# Patient Record
Sex: MALE | Race: WHITE | Employment: OTHER | URBAN - METROPOLITAN AREA
[De-identification: names, ages, dates, MRNs, and addresses within clinical notes are randomized per-mention and may not be internally consistent; named-entity substitution may affect disease eponyms.]

---

## 2024-01-12 ENCOUNTER — OFFICE VISIT (OUTPATIENT)
Dept: ORTHOPEDIC SURGERY | Age: 79
End: 2024-01-12

## 2024-01-12 VITALS — HEIGHT: 70 IN | BODY MASS INDEX: 26.77 KG/M2 | WEIGHT: 187 LBS

## 2024-01-12 DIAGNOSIS — M17.0 PRIMARY OSTEOARTHRITIS OF BOTH KNEES: Primary | ICD-10-CM

## 2024-01-12 NOTE — PROGRESS NOTES
[facility] Orthopaedics and Spine  Office Visit    Chief Complaint: Bilateral knee pain    HPI:  Vicente Cervantes is a 78 y.o. who is here for evaluation of bilateral knee pain, more symptomatic on the right.  He reports multiple year history of bilateral knee pain, worsened in the last 4 months.  He has been seen and treated by  In the past with repeated injections[facility]illy at [facility] with steroid.  He denies history of injury or surgery to either knee.  He is active but currently has increased pain with steps and on hills.  He enjoys golfing and taking care of his grandkids.  He has been on over-the-counter NSAIDs and continues to have pain.  He walks without assistive device. The patient has difficulty climbing stairs, difficulty getting out of a chair, difficulty with activities of daily living including hygiene and pain at night.  Pain level at rest is 7 and with activities 9-10/10.    He denies diabetes, tobacco use, history of blood clots, blood thinners, heart or lung issues.  He has help at home.      Past Medical History:   Diagnosis Date    Chronic back pain     Hyperlipidemia     Hypertension         ROS:  Constitutional: denies fever, chills, weight loss  MSK: denies pain in other joints, muscle aches  Neurological: denies numbness, tingling, weakness    Exam:  Ht 1.778 m (5' 10\")   Wt 84.8 kg (187 lb)   BMI 26.83 kg/m²      Appearance: sitting in exam room chair, appears to be in no acute distress, awake and alert  Resp: unlabored breathing on room air  Skin: warm, dry and intact with out erythema or significant increased temperature  Neuro: grossly intact both lower extremities. Intact sensation to light touch. Motor exam 4+ to 5/5 in all major motor groups.  BLE: Examination reveals that active knee range of motion is 5 to 90 degrees.  There is varus deformity, positive crepitus, positive joint line tenderness, positive antalgic gait.  Neurologically, plantar flexion and dorsiflexion is

## 2024-01-15 NOTE — PLAN OF CARE
01/16/2024  Prepared by: Kristie Pettit    Exercises  - Supine Straight Leg Raises  - 2 x daily - 7 x weekly - 1 sets - 10 reps  - Mini Squat with Counter Support  - 2 x daily - 7 x weekly - 1 sets - 10 reps  - Supine Heel Slide with Strap  - 2 x daily - 7 x weekly - 1 sets - 10 reps  - Supine Quad Set  - 2 x daily - 7 x weekly - 1 sets - 10 reps - 5 sec hold  - Supine Gluteal Sets  - 2 x daily - 7 x weekly - 1 sets - 10 reps - 5 sec hold  - Supine Ankle Pumps  - 5-6 x daily - 7 x weekly - 1 sets - 10 reps  - Seated Knee Flexion Extension AROM   - 10-12 x daily - 7 x weekly - 1 sets - 10 reps  - Ice  - 2 x daily - 7 x weekly - 1 sets - 10 reps    Patient Education  - TKR: How Early Mobility Aids Recovery     Patient education:  Patient was thoroughly educated on this date regarding prehabilitation goals, importance of PT sessions in improving overall ROM, strength and stability prior to surgery, and how prehabilitation will facilitate improved post-operative outcomes. The patient was educated on and instructed in HEP as listed. The patient was given a detailed handout for exercises to initiate in the hospital post-operatively as well as at home. The discharge plan from the hospital was reviewed with the patient; specifically, to reduce length of hospital stay and to minimize time before reinitiating outpatient physical therapy after surgery. Education regarding early mobility post-operatively in the hospital and emphasis on working with both physical therapy and nursing staff to achieve functional mobility to safely navigate at home. Also, education regarding goals and expectations was provided; specifically, maximizing ROM to promote improved gait mechanics and ambulation. The patient was educated about all applicable post-op restrictions and precautions. The patient was encouraged to utilize ice/cold pack after surgery to address pain, minimize swelling as often as possible. It is in my medical opinion that this

## 2024-01-16 ENCOUNTER — HOSPITAL ENCOUNTER (OUTPATIENT)
Dept: PHYSICAL THERAPY | Age: 79
Setting detail: THERAPIES SERIES
Discharge: HOME | End: 2024-01-16
Attending: ORTHOPAEDIC SURGERY
Payer: MEDICARE

## 2024-01-16 DIAGNOSIS — Z78.9 NEED FOR HOME EXERCISE PROGRAM: ICD-10-CM

## 2024-01-16 DIAGNOSIS — R68.89 DECREASED ACTIVITY TOLERANCE: ICD-10-CM

## 2024-01-16 DIAGNOSIS — R26.89 DECREASED FUNCTIONAL MOBILITY: Primary | ICD-10-CM

## 2024-01-16 DIAGNOSIS — M25.561 RIGHT KNEE PAIN, UNSPECIFIED CHRONICITY: ICD-10-CM

## 2024-01-16 PROCEDURE — 97112 NEUROMUSCULAR REEDUCATION: CPT

## 2024-01-16 PROCEDURE — 97161 PT EVAL LOW COMPLEX 20 MIN: CPT

## 2024-01-16 PROCEDURE — 97110 THERAPEUTIC EXERCISES: CPT

## 2024-01-18 ENCOUNTER — TELEPHONE (OUTPATIENT)
Dept: ORTHOPEDIC SURGERY | Age: 79
End: 2024-01-18

## 2024-01-18 NOTE — TELEPHONE ENCOUNTER
General Question     Subject: QUESTIONS OF SX/PREOP  Patient and /or Facility Request: DRISS [facility]ANCE  Contact Number: [phone]    PATIENT CALLED TO SPEAK WITH CLINICAL TO SPEAK WITH THEM-PREOP/SX QUESTIONS FOR UPCOMING SURGERY.     PLEASE ADVISE

## 2024-01-22 ENCOUNTER — HOSPITAL ENCOUNTER (OUTPATIENT)
Dept: PREADMISSION TESTING | Age: 79
Discharge: HOME | End: 2024-01-26
Payer: MEDICARE

## 2024-01-22 DIAGNOSIS — M17.11 PRIMARY OSTEOARTHRITIS OF RIGHT KNEE: ICD-10-CM

## 2024-01-22 LAB
25(OH)D3 SERPL-MCNC: 31.1 NG/ML
ABO + RH BLD: NORMAL
ALBUMIN SERPL-MCNC: 4.3 G/DL (ref 3.4–5)
ANION GAP SERPL CALCULATED.3IONS-SCNC: 11 MMOL/L (ref 3–16)
APTT BLD: 34.6 SEC (ref 22.7–35.9)
BACTERIA URNS QL MICRO: ABNORMAL /HPF
BASOPHILS # BLD: 0 K/UL (ref 0–0.2)
BASOPHILS NFR BLD: 0.6 %
BILIRUB UR QL STRIP.AUTO: NEGATIVE
BLD GP AB SCN SERPL QL: NORMAL
BUN SERPL-MCNC: 14 MG/DL (ref 7–20)
CALCIUM SERPL-MCNC: 9.2 MG/DL (ref 8.3–10.6)
CHLORIDE SERPL-SCNC: 103 MMOL/L (ref 99–110)
CLARITY UR: CLEAR
CO2 SERPL-SCNC: 27 MMOL/L (ref 21–32)
COLOR UR: YELLOW
CREAT SERPL-MCNC: 0.9 MG/DL (ref 0.8–1.3)
DEPRECATED RDW RBC AUTO: 13.2 % (ref 12.4–15.4)
EKG ATRIAL RATE: 98 BPM
EKG DIAGNOSIS: NORMAL
EKG P AXIS: 66 DEGREES
EKG P-R INTERVAL: 240 MS
EKG Q-T INTERVAL: 342 MS
EKG QRS DURATION: 116 MS
EKG QTC CALCULATION (BAZETT): 436 MS
EKG R AXIS: -31 DEGREES
EKG T AXIS: 31 DEGREES
EKG VENTRICULAR RATE: 98 BPM
EOSINOPHIL # BLD: 0.1 K/UL (ref 0–0.6)
EOSINOPHIL NFR BLD: 1.3 %
EPI CELLS #/AREA URNS AUTO: 0 /HPF (ref 0–5)
GFR SERPLBLD CREATININE-BSD FMLA CKD-EPI: >60 ML/MIN/{1.73_M2}
GLUCOSE SERPL-MCNC: 138 MG/DL (ref 70–99)
GLUCOSE UR STRIP.AUTO-MCNC: NEGATIVE MG/DL
HCT VFR BLD AUTO: 44 % (ref 40.5–52.5)
HGB BLD-MCNC: 15.1 G/DL (ref 13.5–17.5)
HGB UR QL STRIP.AUTO: NEGATIVE
HYALINE CASTS #/AREA URNS AUTO: 0 /LPF (ref 0–8)
INR PPP: 1 (ref 0.84–1.16)
KETONES UR STRIP.AUTO-MCNC: NEGATIVE MG/DL
LEUKOCYTE ESTERASE UR QL STRIP.AUTO: ABNORMAL
LYMPHOCYTES # BLD: 1.3 K/UL (ref 1–5.1)
LYMPHOCYTES NFR BLD: 24.4 %
MCH RBC QN AUTO: 31.8 PG (ref 26–34)
MCHC RBC AUTO-ENTMCNC: 34.4 G/DL (ref 31–36)
MCV RBC AUTO: 92.4 FL (ref 80–100)
MONOCYTES # BLD: 0.4 K/UL (ref 0–1.3)
MONOCYTES NFR BLD: 6.7 %
MUCUS: PRESENT
NEUTROPHILS # BLD: 3.7 K/UL (ref 1.7–7.7)
NEUTROPHILS NFR BLD: 67 %
NITRITE UR QL STRIP.AUTO: NEGATIVE
PH UR STRIP.AUTO: 6 [PH] (ref 5–8)
PLATELET # BLD AUTO: 182 K/UL (ref 135–450)
PMV BLD AUTO: 9 FL (ref 5–10.5)
POTASSIUM SERPL-SCNC: 3.5 MMOL/L (ref 3.5–5.1)
PREALB SERPL-MCNC: 21.1 MG/DL (ref 20–40)
PROT UR STRIP.AUTO-MCNC: NEGATIVE MG/DL
PROTHROMBIN TIME: 13.2 SEC (ref 11.5–14.8)
RBC # BLD AUTO: 4.77 M/UL (ref 4.2–5.9)
RBC CLUMPS #/AREA URNS AUTO: 1 /HPF (ref 0–4)
SODIUM SERPL-SCNC: 141 MMOL/L (ref 136–145)
SP GR UR STRIP.AUTO: 1.02 (ref 1–1.03)
UA COMPLETE W REFLEX CULTURE PNL UR: ABNORMAL
UA DIPSTICK W REFLEX MICRO PNL UR: YES
URN SPEC COLLECT METH UR: ABNORMAL
UROBILINOGEN UR STRIP-ACNC: 1 E.U./DL
WBC # BLD AUTO: 5.5 K/UL (ref 4–11)
WBC #/AREA URNS AUTO: 6 /HPF (ref 0–5)

## 2024-01-22 PROCEDURE — 86900 BLOOD TYPING SEROLOGIC ABO: CPT

## 2024-01-22 PROCEDURE — 80048 BASIC METABOLIC PNL TOTAL CA: CPT

## 2024-01-22 PROCEDURE — 82306 VITAMIN D 25 HYDROXY: CPT

## 2024-01-22 PROCEDURE — 85025 COMPLETE CBC W/AUTO DIFF WBC: CPT

## 2024-01-22 PROCEDURE — 85730 THROMBOPLASTIN TIME PARTIAL: CPT

## 2024-01-22 PROCEDURE — 83036 HEMOGLOBIN GLYCOSYLATED A1C: CPT

## 2024-01-22 PROCEDURE — 85610 PROTHROMBIN TIME: CPT

## 2024-01-22 PROCEDURE — 81001 URINALYSIS AUTO W/SCOPE: CPT

## 2024-01-22 PROCEDURE — 84134 ASSAY OF PREALBUMIN: CPT

## 2024-01-22 PROCEDURE — 86850 RBC ANTIBODY SCREEN: CPT

## 2024-01-22 PROCEDURE — 87641 MR-STAPH DNA AMP PROBE: CPT

## 2024-01-22 PROCEDURE — 93005 ELECTROCARDIOGRAM TRACING: CPT

## 2024-01-22 PROCEDURE — 82040 ASSAY OF SERUM ALBUMIN: CPT

## 2024-01-22 PROCEDURE — 86901 BLOOD TYPING SEROLOGIC RH(D): CPT

## 2024-01-22 NOTE — PROGRESS NOTES
Patient  attended JET class on  1/22/2024  . Patient verified surgery for Total Knee replacement. Patient  received patient information and educational JET folder including the following handouts: jet powerpoint, ERAS, incentive spirometry including purpose and how to perform, case management contact information, hand hygiene, preventing constipation, choices for home health care agency list, pre-operative showering techniques and the use of anti-septic 3 days before surgery.  Interviews completed by PT, OT, and Nurse Navigator.  Labs and Tests to be completed/completed as ordered/necessary by outpatient lab if not already completed.  Anti-septic bottle given to patient to take home. Patient  states no further questions or concerns. Patient provided with orthopedic office, after hours clinic, case management, and nurse navigator contact information.    Date Of Surgery: 2/6/2024  Surgeon: Dr Pate  Per Patient Will see/Saw Primary care provider on 2/1/2024.       HISTORY OF JOINT REPLACEMENT(S): No history of joint replacement    DC Plan: Home    HOME ASSISTANCE - WHO WILL BE STAYING WITH YOU AT HOME FOR FIRST SEVERAL DAYS? spouse Amanda    DC TRANSPORTATION: spouse Amanda    STEPS INTO HOME: 1    STEPS TO BATHROOM/BEDROOM: able to live on the first level, full bath and bedroom on second floor with 10 steps from first level.    DME NEEDS:  needs a transfer tub bench, he might have a two wheeled walker. Plans to obtain transfer tub bench preop.    LENGTH OF STAY HAS BEEN DISCUSSED WITH THE PATIENT, APPROPRIATE TO HIS/ HER PROCEDURE.  PATIENT HAS BEEN INFORMED THAT THEY WILL BE DISCHARGED WHEN THE PHYSICIAN DEEMS THEM MEDICALLY READY. MOST PATIENTS CAN EXPECT TO BE IN THE HOSPITAL ONE NIGHT OR 1-2 DAYS AS AN ADMISSION FOR THOSE WITH MEDICAL HEALTH ISSUES/COMPLICATIONS.    HOME CARE CHOICE(S): open to any agency, home health care list was provided to patient.    SNF/REHAB CHOICES (S):     Declined a need for skilled

## 2024-01-23 ENCOUNTER — TELEPHONE (OUTPATIENT)
Dept: ORTHOPEDIC SURGERY | Age: 79
End: 2024-01-23

## 2024-01-23 LAB
EST. AVERAGE GLUCOSE BLD GHB EST-MCNC: 114 MG/DL
HBA1C MFR BLD: 5.6 %
MRSA DNA SPEC QL NAA+PROBE: NORMAL

## 2024-01-23 RX ORDER — LOSARTAN POTASSIUM AND HYDROCHLOROTHIAZIDE 12.5; 5 MG/1; MG/1
1 TABLET ORAL DAILY
COMMUNITY

## 2024-01-23 RX ORDER — BIMATOPROST 0.1 MG/ML
1 SOLUTION/ DROPS OPHTHALMIC NIGHTLY
COMMUNITY

## 2024-01-23 RX ORDER — ROSUVASTATIN CALCIUM 20 MG/1
20 TABLET, COATED ORAL NIGHTLY
COMMUNITY
Start: 2020-10-02

## 2024-01-23 NOTE — TELEPHONE ENCOUNTER
[facility] [employer] [facility]  Reference # [id number]

## 2024-01-25 NOTE — TELEPHONE ENCOUNTER
Auth: # [id number]     Date Range: 02/06/2024 - 05/05/2024   Type of SX:  OUTPATIENT  Location: [facility]  CPT: 69586   DX Code: M17.11  SX area: RIGHT KNEE  Insurance: AMY

## 2024-01-26 ENCOUNTER — HOSPITAL ENCOUNTER (OUTPATIENT)
Dept: CT IMAGING | Age: 79
Discharge: HOME | End: 2024-01-26
Attending: ORTHOPAEDIC SURGERY
Payer: MEDICARE

## 2024-01-26 DIAGNOSIS — M17.11 PRIMARY OSTEOARTHRITIS OF RIGHT KNEE: ICD-10-CM

## 2024-01-26 PROCEDURE — 73700 CT LOWER EXTREMITY W/O DYE: CPT

## 2024-01-29 ENCOUNTER — OFFICE VISIT (OUTPATIENT)
Dept: ORTHOPEDIC SURGERY | Age: 79
End: 2024-01-29
Payer: MEDICARE

## 2024-01-29 ENCOUNTER — TELEPHONE (OUTPATIENT)
Dept: ORTHOPEDIC SURGERY | Age: 79
End: 2024-01-29

## 2024-01-29 VITALS — BODY MASS INDEX: 26.26 KG/M2 | WEIGHT: 183.4 LBS | HEIGHT: 70 IN

## 2024-01-29 DIAGNOSIS — M17.11 PRIMARY OSTEOARTHRITIS OF RIGHT KNEE: Primary | ICD-10-CM

## 2024-01-29 PROCEDURE — 99214 OFFICE O/P EST MOD 30 MIN: CPT | Performed by: ORTHOPAEDIC SURGERY

## 2024-01-29 PROCEDURE — MISC60 ORTHOTIC REFURBISHMENT 60: Performed by: ORTHOPAEDIC SURGERY

## 2024-01-29 PROCEDURE — 1123F ACP DISCUSS/DSCN MKR DOCD: CPT | Performed by: ORTHOPAEDIC SURGERY

## 2024-01-29 NOTE — PROGRESS NOTES
[facility] Orthopaedics and Spine  Office Visit    Chief Complaint: Bilateral knee pain    HPI:  Vicente Cervantes is a 78 y.o. who is here in follow-up of bilateral knee pain, more symptomatic on the right.  He is scheduled for right total knee arthroplasty next week.  For review, he reports multiple year history of bilateral knee pain, worsened in the last months.  He has been seen and treated by Dr. Sheets in the past with repeated injections with steroid.  He denies history of injury or surgery to either knee.  He is active but currently has increased pain with steps and on hills.  He enjoys golfing and taking care of his grandkids.  He has been on over-the-counter NSAIDs and continues to have pain.  He walks without assistive device. The patient has difficulty climbing stairs, difficulty getting out of a chair, difficulty with activities of daily living including hygiene and pain at night.  Pain level at rest is 7 and with activities 9-10/10.    He denies diabetes, tobacco use, history of blood clots, blood thinners, heart or lung issues.  He has help at home.      Past Medical History:   Diagnosis Date    Chronic back pain     Hyperlipidemia     Hypertension         ROS:  Constitutional: denies fever, chills, weight loss  MSK: denies pain in other joints, muscle aches  Neurological: denies numbness, tingling, weakness    Exam:  Appearance: sitting in exam room chair, appears to be in no acute distress, awake and alert  Resp: unlabored breathing on room air  Skin: warm, dry and intact with out erythema or significant increased temperature  Neuro: grossly intact both lower extremities. Intact sensation to light touch. Motor exam 4+ to 5/5 in all major motor groups.  BLE: Examination reveals that active knee range of motion is 5 to 90 degrees.  There is varus deformity, positive crepitus, positive joint line tenderness, positive antalgic gait.  Neurologically, plantar flexion and dorsiflexion is intact.

## 2024-01-29 NOTE — TELEPHONE ENCOUNTER
General Question     Subject: POST OP HOME CARE  Patient and /or Facility Request: DRISS VANCE  Contact Number: [phone]    PT'S WIFE ARISTEO CALLED STATING PT IS SCHEDULED TO HAVE SURGERY WITH DR PAULINO ON 2/6/24, SHE STATED PHYSICAL THERAPIST WILL BE COMING TO HIS HOUSE AFTER SURGERY, PT'S WIFE IS WANTING TO KNOW IF THIS NEEDS TO GET AUTHORIZED THROUGH INSURANCE FIRST OR IF THEY HAVE TO PAY OUT OF POCKET.    PLEASE CALL PT'S WIFE BACK AT THE ABOVE NUMBER.

## 2024-01-30 ENCOUNTER — TELEPHONE (OUTPATIENT)
Dept: ORTHOPEDIC SURGERY | Age: 79
End: 2024-01-30

## 2024-01-30 NOTE — TELEPHONE ENCOUNTER
Surgery and/or Procedure Scheduling     PT - HAS IT BEEN AUTHORIZED BY INSURANCE ALREADY    Contact Name: CervantesVicente \"Bill\"  Surgical/Procedure Request: PT   Patient Contact Number: [phone]     Pt WOULD LIKE TO MAKE SURE THE Pt PA HAS BEEN REQESTED/IS DONE. HE IS MAKING SURE HE HAS EVERYTHING IN ORDER BEFORE THE SX. PLEASE CALL @ THE # ABOVE, AND OK TO TRY THE HOME PHONE, IF HE DOESN'T ANSWER THE CELL #.

## 2024-02-01 NOTE — DISCHARGE INSTR - COC
Or need medication refills.   Excessive swelling in your ankle.  You develop numbness, tingling, or decreased movement.  You have a fever greater than 100 degrees for a day or over 101 degrees at any one time.  If you fall.    You have any questions about your recovery.  Inform your family doctor/dentist or any other doctor who cares for you in the future that you have a joint replacement.  They may want to order antibiotics for dental or surgical procedures.  If you have required the use of insulin to control your blood sugar after surgery, follow up with your family doctor.   If you have any questions regarding your discharge instructions, please call Radha Pereyra Nurse Navigator [phone] or your surgeon's office.

## 2024-02-02 ENCOUNTER — TELEPHONE (OUTPATIENT)
Dept: ORTHOPEDIC SURGERY | Age: 79
End: 2024-02-02

## 2024-02-05 ENCOUNTER — ANESTHESIA EVENT (OUTPATIENT)
Dept: OPERATING ROOM | Age: 79
End: 2024-02-05
Payer: MEDICARE

## 2024-02-06 ENCOUNTER — ANESTHESIA (OUTPATIENT)
Dept: OPERATING ROOM | Age: 79
End: 2024-02-06
Payer: MEDICARE

## 2024-02-06 ENCOUNTER — APPOINTMENT (OUTPATIENT)
Dept: GENERAL RADIOLOGY | Age: 79
End: 2024-02-06
Attending: ORTHOPAEDIC SURGERY
Payer: MEDICARE

## 2024-02-06 ENCOUNTER — HOSPITAL ENCOUNTER (OUTPATIENT)
Age: 79
Setting detail: SURGERY ADMIT
Discharge: HOME | End: 2024-02-06
Attending: ORTHOPAEDIC SURGERY | Admitting: ORTHOPAEDIC SURGERY
Payer: MEDICARE

## 2024-02-06 VITALS
TEMPERATURE: 97.1 F | OXYGEN SATURATION: 95 % | HEIGHT: 70 IN | WEIGHT: 185.74 LBS | DIASTOLIC BLOOD PRESSURE: 76 MMHG | HEART RATE: 59 BPM | SYSTOLIC BLOOD PRESSURE: 154 MMHG | RESPIRATION RATE: 16 BRPM | BODY MASS INDEX: 26.59 KG/M2

## 2024-02-06 DIAGNOSIS — M17.11 PRIMARY OSTEOARTHRITIS OF RIGHT KNEE: Primary | ICD-10-CM

## 2024-02-06 LAB
ABO + RH BLD: NORMAL
BLD GP AB SCN SERPL QL: NORMAL
GLUCOSE BLD-MCNC: 97 MG/DL (ref 70–99)
PERFORMED ON: NORMAL

## 2024-02-06 PROCEDURE — 2500000003 HC RX 250 WO HCPCS: Performed by: NURSE ANESTHETIST, CERTIFIED REGISTERED

## 2024-02-06 PROCEDURE — 97530 THERAPEUTIC ACTIVITIES: CPT

## 2024-02-06 PROCEDURE — 3700000000 HC ANESTHESIA ATTENDED CARE: Performed by: ORTHOPAEDIC SURGERY

## 2024-02-06 PROCEDURE — 86850 RBC ANTIBODY SCREEN: CPT

## 2024-02-06 PROCEDURE — 2580000003 HC RX 258: Performed by: ANESTHESIOLOGY

## 2024-02-06 PROCEDURE — 6370000000 HC RX 637 (ALT 250 FOR IP): Performed by: NURSE PRACTITIONER

## 2024-02-06 PROCEDURE — 97165 OT EVAL LOW COMPLEX 30 MIN: CPT

## 2024-02-06 PROCEDURE — 6360000002 HC RX W HCPCS: Performed by: ANESTHESIOLOGY

## 2024-02-06 PROCEDURE — C9290 INJ, BUPIVACAINE LIPOSOME: HCPCS | Performed by: ORTHOPAEDIC SURGERY

## 2024-02-06 PROCEDURE — C1776 JOINT DEVICE (IMPLANTABLE): HCPCS | Performed by: ORTHOPAEDIC SURGERY

## 2024-02-06 PROCEDURE — 7100000000 HC PACU RECOVERY - FIRST 15 MIN: Performed by: ORTHOPAEDIC SURGERY

## 2024-02-06 PROCEDURE — 2580000003 HC RX 258: Performed by: ORTHOPAEDIC SURGERY

## 2024-02-06 PROCEDURE — 6360000002 HC RX W HCPCS

## 2024-02-06 PROCEDURE — 6360000002 HC RX W HCPCS: Performed by: NURSE ANESTHETIST, CERTIFIED REGISTERED

## 2024-02-06 PROCEDURE — 7100000001 HC PACU RECOVERY - ADDTL 15 MIN: Performed by: ORTHOPAEDIC SURGERY

## 2024-02-06 PROCEDURE — 2580000003 HC RX 258: Performed by: NURSE ANESTHETIST, CERTIFIED REGISTERED

## 2024-02-06 PROCEDURE — 6360000002 HC RX W HCPCS: Performed by: ORTHOPAEDIC SURGERY

## 2024-02-06 PROCEDURE — 3600000015 HC SURGERY LEVEL 5 ADDTL 15MIN: Performed by: ORTHOPAEDIC SURGERY

## 2024-02-06 PROCEDURE — 7100000010 HC PHASE II RECOVERY - FIRST 15 MIN: Performed by: ORTHOPAEDIC SURGERY

## 2024-02-06 PROCEDURE — 2720000010 HC SURG SUPPLY STERILE: Performed by: ORTHOPAEDIC SURGERY

## 2024-02-06 PROCEDURE — 97116 GAIT TRAINING THERAPY: CPT

## 2024-02-06 PROCEDURE — 86900 BLOOD TYPING SEROLOGIC ABO: CPT

## 2024-02-06 PROCEDURE — 7100000011 HC PHASE II RECOVERY - ADDTL 15 MIN: Performed by: ORTHOPAEDIC SURGERY

## 2024-02-06 PROCEDURE — 3600000005 HC SURGERY LEVEL 5 BASE: Performed by: ORTHOPAEDIC SURGERY

## 2024-02-06 PROCEDURE — 3700000001 HC ADD 15 MINUTES (ANESTHESIA): Performed by: ORTHOPAEDIC SURGERY

## 2024-02-06 PROCEDURE — 97162 PT EVAL MOD COMPLEX 30 MIN: CPT

## 2024-02-06 PROCEDURE — A4217 STERILE WATER/SALINE, 500 ML: HCPCS | Performed by: ORTHOPAEDIC SURGERY

## 2024-02-06 PROCEDURE — 6370000000 HC RX 637 (ALT 250 FOR IP): Performed by: ANESTHESIOLOGY

## 2024-02-06 PROCEDURE — 64447 NJX AA&/STRD FEMORAL NRV IMG: CPT | Performed by: ANESTHESIOLOGY

## 2024-02-06 PROCEDURE — 6370000000 HC RX 637 (ALT 250 FOR IP): Performed by: ORTHOPAEDIC SURGERY

## 2024-02-06 PROCEDURE — 97535 SELF CARE MNGMENT TRAINING: CPT

## 2024-02-06 PROCEDURE — 73560 X-RAY EXAM OF KNEE 1 OR 2: CPT

## 2024-02-06 PROCEDURE — 86901 BLOOD TYPING SEROLOGIC RH(D): CPT

## 2024-02-06 PROCEDURE — 97110 THERAPEUTIC EXERCISES: CPT

## 2024-02-06 PROCEDURE — 2709999900 HC NON-CHARGEABLE SUPPLY: Performed by: ORTHOPAEDIC SURGERY

## 2024-02-06 DEVICE — BASEPLATE TIB SZ 6 AP52MM ML77MM KNEE TRITANIUM 4 CRUCFRM: Type: IMPLANTABLE DEVICE | Site: KNEE | Status: FUNCTIONAL

## 2024-02-06 DEVICE — IMPL KNEE TIB INSRT POSTR SZ 6 9MM: Type: IMPLANTABLE DEVICE | Site: KNEE | Status: FUNCTIONAL

## 2024-02-06 DEVICE — COMPONENT PAT DIA35MM THK10MM SUPERIOR/INFERIOR KNEE: Type: IMPLANTABLE DEVICE | Site: KNEE | Status: FUNCTIONAL

## 2024-02-06 DEVICE — IMPLANTABLE DEVICE: Type: IMPLANTABLE DEVICE | Site: KNEE | Status: FUNCTIONAL

## 2024-02-06 RX ORDER — GLYCOPYRROLATE 0.2 MG/ML
INJECTION INTRAMUSCULAR; INTRAVENOUS PRN
Status: DISCONTINUED | OUTPATIENT
Start: 2024-02-06 | End: 2024-02-06 | Stop reason: SDUPTHER

## 2024-02-06 RX ORDER — HALOPERIDOL 5 MG/ML
1 INJECTION INTRAMUSCULAR
Status: DISCONTINUED | OUTPATIENT
Start: 2024-02-06 | End: 2024-02-06 | Stop reason: HOSPADM

## 2024-02-06 RX ORDER — SODIUM CHLORIDE 9 MG/ML
INJECTION, SOLUTION INTRAVENOUS CONTINUOUS PRN
Status: DISCONTINUED | OUTPATIENT
Start: 2024-02-06 | End: 2024-02-06 | Stop reason: SDUPTHER

## 2024-02-06 RX ORDER — ROPIVACAINE HYDROCHLORIDE 5 MG/ML
INJECTION, SOLUTION EPIDURAL; INFILTRATION; PERINEURAL
Status: COMPLETED | OUTPATIENT
Start: 2024-02-06 | End: 2024-02-06

## 2024-02-06 RX ORDER — PROPOFOL 10 MG/ML
INJECTION, EMULSION INTRAVENOUS PRN
Status: DISCONTINUED | OUTPATIENT
Start: 2024-02-06 | End: 2024-02-06 | Stop reason: SDUPTHER

## 2024-02-06 RX ORDER — OXYCODONE HYDROCHLORIDE 5 MG/1
5 TABLET ORAL
Status: COMPLETED | OUTPATIENT
Start: 2024-02-06 | End: 2024-02-06

## 2024-02-06 RX ORDER — SODIUM CHLORIDE 0.9 % (FLUSH) 0.9 %
5-40 SYRINGE (ML) INJECTION PRN
Status: DISCONTINUED | OUTPATIENT
Start: 2024-02-06 | End: 2024-02-06 | Stop reason: HOSPADM

## 2024-02-06 RX ORDER — OXYCODONE HYDROCHLORIDE 10 MG/1
10 TABLET ORAL ONCE
Status: COMPLETED | OUTPATIENT
Start: 2024-02-06 | End: 2024-02-06

## 2024-02-06 RX ORDER — FENTANYL CITRATE 50 UG/ML
INJECTION, SOLUTION INTRAMUSCULAR; INTRAVENOUS PRN
Status: DISCONTINUED | OUTPATIENT
Start: 2024-02-06 | End: 2024-02-06 | Stop reason: SDUPTHER

## 2024-02-06 RX ORDER — MAGNESIUM HYDROXIDE 1200 MG/15ML
LIQUID ORAL CONTINUOUS PRN
Status: COMPLETED | OUTPATIENT
Start: 2024-02-06 | End: 2024-02-06

## 2024-02-06 RX ORDER — SODIUM CHLORIDE 0.9 % (FLUSH) 0.9 %
5-40 SYRINGE (ML) INJECTION EVERY 12 HOURS SCHEDULED
Status: DISCONTINUED | OUTPATIENT
Start: 2024-02-06 | End: 2024-02-06 | Stop reason: HOSPADM

## 2024-02-06 RX ORDER — MELOXICAM 7.5 MG/1
7.5 TABLET ORAL ONCE
Status: COMPLETED | OUTPATIENT
Start: 2024-02-06 | End: 2024-02-06

## 2024-02-06 RX ORDER — FENTANYL CITRATE 0.05 MG/ML
50 INJECTION, SOLUTION INTRAMUSCULAR; INTRAVENOUS EVERY 5 MIN PRN
Status: DISCONTINUED | OUTPATIENT
Start: 2024-02-06 | End: 2024-02-06 | Stop reason: HOSPADM

## 2024-02-06 RX ORDER — ACETAMINOPHEN 500 MG
1000 TABLET ORAL ONCE
Status: COMPLETED | OUTPATIENT
Start: 2024-02-06 | End: 2024-02-06

## 2024-02-06 RX ORDER — SODIUM CHLORIDE 9 MG/ML
INJECTION, SOLUTION INTRAVENOUS PRN
Status: DISCONTINUED | OUTPATIENT
Start: 2024-02-06 | End: 2024-02-06 | Stop reason: HOSPADM

## 2024-02-06 RX ORDER — TRANEXAMIC ACID 650 MG/1
1950 TABLET ORAL ONCE
Status: COMPLETED | OUTPATIENT
Start: 2024-02-06 | End: 2024-02-06

## 2024-02-06 RX ORDER — ONDANSETRON 2 MG/ML
INJECTION INTRAMUSCULAR; INTRAVENOUS PRN
Status: DISCONTINUED | OUTPATIENT
Start: 2024-02-06 | End: 2024-02-06 | Stop reason: SDUPTHER

## 2024-02-06 RX ORDER — OXYCODONE HYDROCHLORIDE 5 MG/1
5-10 TABLET ORAL EVERY 6 HOURS PRN
Qty: 40 TABLET | Refills: 0 | Status: SHIPPED | OUTPATIENT
Start: 2024-02-06 | End: 2024-02-11

## 2024-02-06 RX ORDER — LIDOCAINE HYDROCHLORIDE 20 MG/ML
INJECTION, SOLUTION EPIDURAL; INFILTRATION; INTRACAUDAL; PERINEURAL PRN
Status: DISCONTINUED | OUTPATIENT
Start: 2024-02-06 | End: 2024-02-06 | Stop reason: SDUPTHER

## 2024-02-06 RX ORDER — DIPHENHYDRAMINE HYDROCHLORIDE 50 MG/ML
12.5 INJECTION INTRAMUSCULAR; INTRAVENOUS
Status: DISCONTINUED | OUTPATIENT
Start: 2024-02-06 | End: 2024-02-06 | Stop reason: HOSPADM

## 2024-02-06 RX ORDER — ASPIRIN 81 MG/1
81 TABLET ORAL
Qty: 28 TABLET | Refills: 0 | Status: SHIPPED | OUTPATIENT
Start: 2024-02-06 | End: 2024-02-20

## 2024-02-06 RX ORDER — ONDANSETRON 2 MG/ML
4 INJECTION INTRAMUSCULAR; INTRAVENOUS
Status: DISCONTINUED | OUTPATIENT
Start: 2024-02-06 | End: 2024-02-06 | Stop reason: HOSPADM

## 2024-02-06 RX ORDER — MEPERIDINE HYDROCHLORIDE 25 MG/ML
12.5 INJECTION INTRAMUSCULAR; INTRAVENOUS; SUBCUTANEOUS
Status: DISCONTINUED | OUTPATIENT
Start: 2024-02-06 | End: 2024-02-06 | Stop reason: HOSPADM

## 2024-02-06 RX ORDER — METHOCARBAMOL 100 MG/ML
INJECTION, SOLUTION INTRAMUSCULAR; INTRAVENOUS PRN
Status: DISCONTINUED | OUTPATIENT
Start: 2024-02-06 | End: 2024-02-06 | Stop reason: SDUPTHER

## 2024-02-06 RX ORDER — DEXAMETHASONE SODIUM PHOSPHATE 4 MG/ML
INJECTION, SOLUTION INTRA-ARTICULAR; INTRALESIONAL; INTRAMUSCULAR; INTRAVENOUS; SOFT TISSUE PRN
Status: DISCONTINUED | OUTPATIENT
Start: 2024-02-06 | End: 2024-02-06 | Stop reason: SDUPTHER

## 2024-02-06 RX ORDER — CEPHALEXIN 500 MG/1
500 CAPSULE ORAL SEE ADMIN INSTRUCTIONS
Qty: 2 CAPSULE | Refills: 0 | Status: SHIPPED | OUTPATIENT
Start: 2024-02-06

## 2024-02-06 RX ORDER — LORAZEPAM 2 MG/ML
0.5 INJECTION INTRAMUSCULAR
Status: DISCONTINUED | OUTPATIENT
Start: 2024-02-06 | End: 2024-02-06 | Stop reason: HOSPADM

## 2024-02-06 RX ORDER — ROCURONIUM BROMIDE 10 MG/ML
INJECTION, SOLUTION INTRAVENOUS PRN
Status: DISCONTINUED | OUTPATIENT
Start: 2024-02-06 | End: 2024-02-06 | Stop reason: SDUPTHER

## 2024-02-06 RX ORDER — SUCCINYLCHOLINE/SOD CL,ISO/PF 200MG/10ML
SYRINGE (ML) INTRAVENOUS PRN
Status: DISCONTINUED | OUTPATIENT
Start: 2024-02-06 | End: 2024-02-06 | Stop reason: SDUPTHER

## 2024-02-06 RX ADMIN — Medication 120 MG: at 09:29

## 2024-02-06 RX ADMIN — ROPIVACAINE HYDROCHLORIDE 15 ML: 5 INJECTION, SOLUTION EPIDURAL; INFILTRATION; PERINEURAL at 08:45

## 2024-02-06 RX ADMIN — CEFAZOLIN 2000 MG: 10 INJECTION, POWDER, FOR SOLUTION INTRAVENOUS at 09:34

## 2024-02-06 RX ADMIN — SODIUM CHLORIDE: 9 INJECTION, SOLUTION INTRAVENOUS at 10:31

## 2024-02-06 RX ADMIN — ROCURONIUM BROMIDE 45 MG: 10 INJECTION INTRAVENOUS at 09:38

## 2024-02-06 RX ADMIN — ROCURONIUM BROMIDE 5 MG: 10 INJECTION INTRAVENOUS at 09:28

## 2024-02-06 RX ADMIN — SODIUM CHLORIDE: 9 INJECTION, SOLUTION INTRAVENOUS at 09:24

## 2024-02-06 RX ADMIN — OXYCODONE 5 MG: 5 TABLET ORAL at 13:34

## 2024-02-06 RX ADMIN — FENTANYL CITRATE 50 MCG: 50 INJECTION INTRAMUSCULAR; INTRAVENOUS at 08:34

## 2024-02-06 RX ADMIN — METHOCARBAMOL 250 MG: 100 INJECTION INTRAMUSCULAR; INTRAVENOUS at 10:17

## 2024-02-06 RX ADMIN — FENTANYL CITRATE 50 MCG: 50 INJECTION INTRAMUSCULAR; INTRAVENOUS at 09:27

## 2024-02-06 RX ADMIN — SODIUM CHLORIDE 2000 MG: 900 INJECTION INTRAVENOUS at 14:38

## 2024-02-06 RX ADMIN — OXYCODONE HYDROCHLORIDE 10 MG: 10 TABLET ORAL at 08:00

## 2024-02-06 RX ADMIN — METHOCARBAMOL 250 MG: 100 INJECTION INTRAMUSCULAR; INTRAVENOUS at 10:02

## 2024-02-06 RX ADMIN — ACETAMINOPHEN 1000 MG: 500 TABLET ORAL at 14:34

## 2024-02-06 RX ADMIN — HYDROMORPHONE HYDROCHLORIDE 0.25 MG: 1 INJECTION, SOLUTION INTRAMUSCULAR; INTRAVENOUS; SUBCUTANEOUS at 10:44

## 2024-02-06 RX ADMIN — SODIUM CHLORIDE: 9 INJECTION, SOLUTION INTRAVENOUS at 07:53

## 2024-02-06 RX ADMIN — ROPIVACAINE HYDROCHLORIDE 15 ML: 5 INJECTION, SOLUTION EPIDURAL; INFILTRATION; PERINEURAL at 08:50

## 2024-02-06 RX ADMIN — DEXAMETHASONE SODIUM PHOSPHATE 8 MG: 4 INJECTION, SOLUTION INTRAMUSCULAR; INTRAVENOUS at 09:38

## 2024-02-06 RX ADMIN — METHOCARBAMOL 250 MG: 100 INJECTION INTRAMUSCULAR; INTRAVENOUS at 09:50

## 2024-02-06 RX ADMIN — SUGAMMADEX 200 MG: 100 INJECTION, SOLUTION INTRAVENOUS at 10:39

## 2024-02-06 RX ADMIN — HYDROMORPHONE HYDROCHLORIDE 0.25 MG: 1 INJECTION, SOLUTION INTRAMUSCULAR; INTRAVENOUS; SUBCUTANEOUS at 11:17

## 2024-02-06 RX ADMIN — METHOCARBAMOL 250 MG: 100 INJECTION INTRAMUSCULAR; INTRAVENOUS at 09:56

## 2024-02-06 RX ADMIN — PROPOFOL 150 MG: 10 INJECTION, EMULSION INTRAVENOUS at 09:28

## 2024-02-06 RX ADMIN — MELOXICAM 7.5 MG: 7.5 TABLET ORAL at 08:00

## 2024-02-06 RX ADMIN — LIDOCAINE HYDROCHLORIDE 80 MG: 20 INJECTION, SOLUTION EPIDURAL; INFILTRATION; INTRACAUDAL; PERINEURAL at 09:27

## 2024-02-06 RX ADMIN — ONDANSETRON 4 MG: 2 INJECTION INTRAMUSCULAR; INTRAVENOUS at 10:31

## 2024-02-06 RX ADMIN — TRANEXAMIC ACID 1950 MG: 650 TABLET ORAL at 08:00

## 2024-02-06 RX ADMIN — GLYCOPYRROLATE 0.2 MG: 0.2 INJECTION INTRAMUSCULAR; INTRAVENOUS at 09:47

## 2024-02-06 ASSESSMENT — LIFESTYLE VARIABLES: SMOKING_STATUS: 0

## 2024-02-06 ASSESSMENT — PAIN SCALES - GENERAL
PAINLEVEL_OUTOF10: 7
PAINLEVEL_OUTOF10: 3
PAINLEVEL_OUTOF10: 5
PAINLEVEL_OUTOF10: 6
PAINLEVEL_OUTOF10: 4
PAINLEVEL_OUTOF10: 5

## 2024-02-06 ASSESSMENT — PAIN DESCRIPTION - PAIN TYPE
TYPE: SURGICAL PAIN
TYPE: SURGICAL PAIN

## 2024-02-06 ASSESSMENT — PAIN - FUNCTIONAL ASSESSMENT
PAIN_FUNCTIONAL_ASSESSMENT: 0-10
PAIN_FUNCTIONAL_ASSESSMENT: PREVENTS OR INTERFERES SOME ACTIVE ACTIVITIES AND ADLS
PAIN_FUNCTIONAL_ASSESSMENT: PREVENTS OR INTERFERES SOME ACTIVE ACTIVITIES AND ADLS
PAIN_FUNCTIONAL_ASSESSMENT: 0-10
PAIN_FUNCTIONAL_ASSESSMENT: PREVENTS OR INTERFERES SOME ACTIVE ACTIVITIES AND ADLS

## 2024-02-06 ASSESSMENT — PAIN DESCRIPTION - DESCRIPTORS
DESCRIPTORS: DISCOMFORT
DESCRIPTORS: ACHING;SORE
DESCRIPTORS: DISCOMFORT
DESCRIPTORS: DISCOMFORT
DESCRIPTORS: DISCOMFORT;SORE

## 2024-02-06 ASSESSMENT — PAIN DESCRIPTION - LOCATION
LOCATION: KNEE

## 2024-02-06 ASSESSMENT — PAIN DESCRIPTION - ORIENTATION
ORIENTATION: RIGHT

## 2024-02-06 ASSESSMENT — PAIN DESCRIPTION - FREQUENCY
FREQUENCY: CONTINUOUS
FREQUENCY: CONTINUOUS

## 2024-02-06 ASSESSMENT — ENCOUNTER SYMPTOMS: SHORTNESS OF BREATH: 0

## 2024-02-06 ASSESSMENT — PAIN DESCRIPTION - ONSET
ONSET: ON-GOING
ONSET: ON-GOING

## 2024-02-06 NOTE — PROGRESS NOTES
Physical Therapy    Facility/Department: [facility] phase II    Physical Therapy Initial Assessment        Name: Vicente Cervantes    : 1945    MRN: [id number]    Date of Service: 2024    Assessment / Discharge Recommendations:    -right TKR  wbat   -managed well and ready for home today with assist of his wife and Home PT to start tomorrow   -reviewed one small height platform step to enter home and car transfers with assist of his wife   -instructed in abbreviated HEP to start this evening and continue tomorrow - Home PT to take charge of care tomorrow  -rolling walker set to proper height in instructed patient and his wife in use   -aarom ~5 to ~70 degrees      Patient Diagnosis(es): The encounter diagnosis was Primary osteoarthritis of right knee.  Past Medical History:  has a past medical history of Chronic back pain, Hyperlipidemia, and Hypertension.  Past Surgical History:  has a past surgical history that includes Colonoscopy; Kidney stone surgery; and Arm Surgery.      Body Structures, Functions, Activity Limitations Requiring Skilled Therapeutic Intervention: Decreased functional mobility ;Decreased ADL status;Decreased strength;Increased pain;Decreased ROM  Therapy Prognosis: Good  Decision Making: Medium Complexity  Requires PT Follow-Up: Yes  Activity Tolerance  Activity Tolerance: Patient tolerated treatment well     Plan   Physical Therapy Plan  Current Treatment Recommendations: Strengthening, Functional mobility training, Transfer training, ADL/Self-care training, Gait training, Positioning, Modalities, Therapeutic activities, Patient/Caregiver education & training  Additional Comments: 1-2 sessions [facility] plan for home today  Safety Devices  Type of Devices: Left in bed, Call light within reach, Gait belt, Nurse notified  Restraints  Restraints Initially in Place: No     Restrictions  Restrictions/Precautions  Restrictions/Precautions: Weight Bearing  Lower Extremity Weight Bearing 
    [facility] Pre-Admission Testing Electronic Communication Worksheet for OR/ENDO Procedures        Patient: Vicente Cervantes    DOS: 2/6    Arrival Time: [place]    Surgery Time:    Meds to Bed:  [x] YES    []  NO    Transportation Confirmed: [x] YES    []  NO    History and Physical:  [] YES    []  NO  [] N/A  If yes, please list doctor or Urgent Care and date of H&P:  SEE EPIC    Additional Clearance(Cardiac, Pulmonary, etc):  [] YES    [x]  NO    Pre-Admission Testing Visit:  [] YES    [x]  NO If no, do labs/testing need to be done DOS?  [] YES    []  NO    Medication Reconciliation Complete:  [x] YES    []  NO        Additional Notes:                Interview Complete: [x] YES    []  NO          Effie Disla RN  8:37 AM  
 Follow Up Prior to Surgery    DOS:   :1945      History and Physical:  Pt will Gilles Kamara MD for HP 8 [phone]    UPDATE:24: PRE-OP H&P IN MEDIA PATIENT CLEARED FOR SURGERY      
Ancef finished infusing. Continuing IV fluids at 125 cc/h. Pt ambulated with PT but unable to void. Radha COLEY present to see pt.  
Bebe performed this morning including Nurse navigator Radha, Physical therapist Gael, and Occupational therapist Shara. Discussed plan of care, discharge plan, and dme needs if applicable for orthopedic total joint patient. I notified checo  and [facility] home care liaison.     
Data- Discharge order received, patient verbalized agreement to discharge, disposition to previous residence, needs noted for Home Health Care and Durable Medical Equipment and informed Ila Valencia NP.     Action- discharge instructions prepared and a hardcopy of AVS instructions given to patient and spouse Amanda, patient and Amanda verbalized understanding. Medication information packet given related to NEW and/or CHANGED prescriptions emphasizing name/purpose/side effects, pt verbalized understanding. Discharge instruction summary: Diet- General, Activity- Full weight bearing, Primary Care Physician as follows: Narcisa Alberts, [phone]. Follow up appointment with orthopedic office noted below, immunizations reviewed and discussed with patient, prescription medications to be filled by retail pharmacy and then delivered. Inpatient surgical procedure precautions reviewed: . Educated patient on using incentive spirometer post-discharge per surgeon's instructions. Neurovascular checks performed and moderate dorsi and plantar flexions noted bilaterally, BLEs appear appropriate to ethnicity in color, Warm to touch, patient denies numbness or tingling in extremities.Right Knee Incision site prineo glue dressing assessed and is clean,dry, and intact, no signs of redness, drainage, or odor noted.  patient's bedside RN Erika notified of patient completing discharge instructions. Nurse Navigator and Orthopedic Office contact information on discharge instructions and provided to patient. Patient just needs to urinate prior to discharge.    Response- Prescriptions delivered to patient via meds to bed program. Disposition is home with Amanda and Home Health Care. Durable Medical Equipment to be delivered to patient by [employer]. Patient to be transported by Amanda .    Future Appointments   Date Time Provider Department Center   2/19/2024 10:00 AM Daniel Pate MD [facility]       
Ila Ortho NP to see pt and removed pt's ace wrap. Wife to see pt but going over to Dr. Pate's office to purchase cooling pad for knee and will return. Called in lunch order for pt. Pt state \"I am not really that hungry.\"  
Lunch tray delivered to pt.  
Notification sent to Dr Pate and medical assistant Effie ALEJANDRO regarding abnormal preoperative labs.    
Pain 4/10 in right knee. Given snack and Oxycodone 1 tablet PO given for pain. Wife returned to bedside.  
Placed call to Franck in dietary for pt's tray. PT called to check on pt's status. Tray is in process of being delivered.  
Pt awake on arrival to phase II. VSS. Pain 5/10 in right knee. Repositioned pt on bed. HOB up. Given ice water and lunch menu. Called for wife.  
Pt dozing at present.   
Pt medicated per orders see mar see flow sheet no belongings in pacu   
Pt up to bathroom with walker. Pt able to void.   
Rep from [employer] to room with walker.  
Still waiting for lunch tray. Pt given tylenol and Ancef per orders. PT to see pt.  
The patients OARRS report has been reviewed online and any prescribing of pain related medications is based on our findings.The patient has been issued narcotics to safely reduce postoperative pain and promote tolerance with physical therapies and ADL's. Reduction in dosing will be addressed with the next narcotic refill request. Dosing is adjusted for patients with history of chronic pain disorders.    
[facility] Orthopedic Surgery   Progress Note      S/P :  SUBJECTIVE  in bed. Alert and oriented. Pain is   described in right knee and with the intensity of mild. Pain is described as aching.       OBJECTIVE              Physical                      VITALS:  BP (!) 154/76   Pulse 59   Temp 97.1 °F (36.2 °C) (Temporal)   Resp 16   Ht 1.778 m (5' 10\")   Wt 84.2 kg (185 lb 11.8 oz)   SpO2 95%   BMI 26.65 kg/m²                     MUSCULOSKELETAL:  right foot NVI. Wiggles toes to command. Able to plantarflex and dorsiflex ankle Pedal pulses are palpable.                    NEUROLOGIC:                                  Sensory:  Touch:  Right Lower Extremity:  normal                                                 Surgical wound appears clean and dry right knee. I removed ACE. Prineo intact and dry. SHARI  hose on. Ice pack on. Wife and I walked to office to get new ice pad for ice machine use at home.     Data       CBC:   Lab Results   Component Value Date/Time    WBC 5.5 01/22/2024 02:20 PM    RBC 4.77 01/22/2024 02:20 PM    HGB 15.1 01/22/2024 02:20 PM    HCT 44.0 01/22/2024 02:20 PM    MCV 92.4 01/22/2024 02:20 PM    MCH 31.8 01/22/2024 02:20 PM    MCHC 34.4 01/22/2024 02:20 PM    RDW 13.2 01/22/2024 02:20 PM     01/22/2024 02:20 PM    MPV 9.0 01/22/2024 02:20 PM        WBC:    Lab Results   Component Value Date/Time    WBC 5.5 01/22/2024 02:20 PM        Hemoglobin/Hematocrit:    Lab Results   Component Value Date/Time    HGB 15.1 01/22/2024 02:20 PM    HCT 44.0 01/22/2024 02:20 PM        PT/INR:    Lab Results   Component Value Date/Time    PROTIME 13.2 01/22/2024 02:20 PM    INR 1.00 01/22/2024 02:20 PM              Current Inpatient Medications             Current Facility-Administered Medications: ceFAZolin (ANCEF) 2,000 mg in sodium chloride 0.9 % 50 mL IVPB (mini-bag), 2,000 mg, IntraVENous, Once  sodium chloride flush 0.9 % injection 5-40 mL, 5-40 mL, IntraVENous, 2 times per day  sodium chloride 
your fingers or toes      For your safety, please do not wear any jewelry or body piercing's on the day of surgery.   All jewelry must be removed.      If you have dentures, they will be removed before going to operating room.    For your convenience, we will provide you with a container.    If you wear contact lenses or glasses, they will be removed, please bring a case for them.     If you have a living will and a durable power of  for healthcare, please bring in a copy.     As part of our patient safety program to minimize surgical site infections, we ask you to do the following:    Please notify your surgeon if you develop any illness between         now and the  day of your surgery.    This includes a cough, cold, fever, sore throat, nausea,         or vomiting, and diarrhea, etc.   Please notify your surgeon if you experience dizziness, shortness         of breath or blurred vision between now and the time of your surgery.      Do not shave your operative site 96 hours prior to surgery.   For face and neck surgery, men may use an electric razor 48 hours   prior to surgery.    You may shower the night before surgery or the morning of   your surgery with an antibacterial soap.    You will need to bring a photo ID and insurance card    [facility] has an onsite pharmacy, would you like to utilize our pharmacy     If you will be staying overnight and use a C-pap machine, please bring   your C-pap to hospital     Our goal is to provide you with excellent care, therefore, visitors will be limited to two(2) in the room at a time so that we may focus on providing this care for you.          Please contact pre-admission testing if you have any further questions.                 Amena España phone number:  [phone]     [facility] fax number:  [phone]  Please note these are generalized instructions for all surgical cases, you may be provided with more specific instructions according to your 
Factors: Pt washed hands in stance at sink CGA/SBA  UE Dressing: Setup;Stand by assistance  UE Dressing Skilled Clinical Factors: Pt doffed gown and donned sweatshirt seated EOB with set up SBA  LE Dressing: Setup;Contact guard assistance;Verbal cueing;Increased time to complete  LE Dressing Skilled Clinical Factors: Pt donned underwear/pants seated EOB with setup and cues to demo LB dressing technique (don surgical LE first), pulling up completely in stance at RW CGA. Pt doffed footies and donned gymshoes bilaterally with set up.  Toileting Skilled Clinical Factors: Pt attempted to urinate in stance at commode, unable to void, RN aware.  Functional Mobility: Contact guard assistance;Stand by assistance  Functional Mobility Skilled Clinical Factors: Pt amb with RW household distance in room/hallway (~80' total) CGA progressing to SBA, demo steady gait, cues for walker safety, no LOB noted throughout.  Additional Comments: Anticipate pt to require up to CGA/min A full ADL completion based on observed strength, balance, ROM, activity tolerance.          Bed mobility  Supine to Sit: Contact guard assistance (HOB elevated)  Sit to Supine: Contact guard assistance (HOB elevated)  Scooting: Contact guard assistance    Transfers  Sit to stand: Contact guard assistance;Stand by assistance  Stand to sit: Contact guard assistance;Stand by assistance  Transfer Comments: RW. Pt transferred <> EOB CGA/SBA with cues for safe hand placement.    Vision  Vision: Impaired  Vision Exceptions: Wears glasses for reading  Hearing  Hearing: Exceptions to WFL  Hearing Exceptions: Bilateral hearing aid    Cognition  Overall Cognitive Status: WFL  Orientation  Overall Orientation Status: Within Functional Limits                 Static Sitting Balance: seated EOB sup  Dynamic Sitting Balance: seated EOB throughout dressing SBA  Static Standing Balance: in stance at RW CGA/SBA for safety  Dynamic Standing Balance: in stance at RW CGA/SBA

## 2024-02-06 NOTE — OP NOTE
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     OPERATION PERFORMED:  Robotic-assisted right total knee arthroplasty.     SURGEON:  Daniel Pate MD     ASSISTANT:  OLIVIA Enriquez    EBL: 100 mL     IMPLANTS:  Ale Triathlon CR cementless femur size 6  Ale Triathlon cementless tibia size 6  9mm CS polyethylene  35mm cementless asymmetric patella     INDICATIONS:  The patient is a 78 y.o. male who presents for right knee replacement. The patient had been treated conservatively with anti-inflammatories, physical therapy, and intra-articular cortisone injections; these treatments were no longer providing significant relief. We discussed total knee arthroplasty. The operative procedure, alternatives, and risks were discussed in detail with the patient.  The risks include but are not limited to: Infection, vessel injury, nerve injury, DVT, pulmonary embolism, implant loosening, need for revision surgery, loss of motion, continued pain. Informed consent for surgery was signed by the patient.     OPERATIVE PROCEDURE:  The patient was seen in the preoperative holding area where the site of surgery was marked and informed consent was confirmed. The patient was brought back to the operating room by OR personnel. Anesthesia was administered. The patient was positioned supine on the operating table. The right lower extremity was then prepped and draped in a standard and sterile fashion. A final and formal timeout was then performed which confirmed the correct patient, correct position, and correct site of surgery. IV antibiotics were administered within 1 hour of the skin incision.     An anterior midline incision was made over the knee. Skin and subcutaneous tissue were divided

## 2024-02-06 NOTE — ANESTHESIA POSTPROCEDURE EVALUATION
[facility] Department of Anesthesiology  Post-Anesthesia Note       Name:  Vicente Cervantes                                  Age:  78 y.o.  MRN:  [id number]     Last Vitals & Oxygen Saturation: BP (!) 154/76   Pulse 59   Temp 97.1 °F (36.2 °C) (Temporal)   Resp 16   Ht 1.778 m (5' 10\")   Wt 84.2 kg (185 lb 11.8 oz)   SpO2 95%   BMI 26.65 kg/m²   Patient Vitals for the past 4 hrs:   BP Temp src Pulse Resp SpO2   02/06/24 1334 -- -- -- 16 --   02/06/24 1247 (!) 154/76 Temporal 59 16 95 %   02/06/24 1230 123/66 -- 56 14 --   02/06/24 1215 122/71 -- 56 20 94 %   02/06/24 1200 124/63 -- 54 15 100 %   02/06/24 1145 124/62 -- 56 12 100 %   02/06/24 1130 116/65 -- 59 13 100 %   02/06/24 1125 -- -- -- -- 100 %       Level of consciousness:  Awake, alert    Respiratory: Respirations easy, no distress. Stable.    Cardiovascular: Hemodynamically stable.    Hydration: Adequate.    PONV: Adequately managed.    Post-op pain: Adequately controlled.    Post-op assessment: Tolerated anesthetic well without complication.    Complications:  None.    Michael Sahu MD  February 6, 2024   3:24 PM
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
## 2024-02-06 NOTE — H&P
Update History & Physical    The patient's History and Physical of February 1, 2024 was reviewed with the patient and I examined the patient. There was no change. The surgical site was confirmed by the patient and me.       Plan: The risks, benefits, expected outcome, and alternative to the recommended procedure have been discussed with the patient. Patient understands and wants to proceed with the procedure.     Electronically signed by CATHIE PAULINO MD on 2/6/2024 at 9:24 AM

## 2024-02-06 NOTE — PLAN OF CARE
Problem: Discharge Planning  Goal: Discharge to home or other facility with appropriate resources  Outcome: Adequate for Discharge  Flowsheets (Taken 2/6/2024 1504)  Discharge to home or other facility with appropriate resources:   Identify barriers to discharge with patient and caregiver   Arrange for needed discharge resources and transportation as appropriate   Identify discharge learning needs (meds, wound care, etc)   Refer to discharge planning if patient needs post-hospital services based on physician order or complex needs related to functional status, cognitive ability or social support system     Problem: Pain  Goal: Verbalizes/displays adequate comfort level or baseline comfort level  Outcome: Adequate for Discharge  Flowsheets (Taken 2/6/2024 1504)  Verbalizes/displays adequate comfort level or baseline comfort level:   Encourage patient to monitor pain and request assistance   Assess pain using appropriate pain scale     Problem: Chronic Conditions and Co-morbidities  Goal: Patient's chronic conditions and co-morbidity symptoms are monitored and maintained or improved  Outcome: Adequate for Discharge  Flowsheets (Taken 2/6/2024 1504)  Care Plan - Patient's Chronic Conditions and Co-Morbidity Symptoms are Monitored and Maintained or Improved: Monitor and assess patient's chronic conditions and comorbid symptoms for stability, deterioration, or improvement     Problem: Neurosensory - Adult  Goal: Achieves stable or improved neurological status  Outcome: Adequate for Discharge  Flowsheets (Taken 2/6/2024 1504)  Achieves stable or improved neurological status: Assess for and report changes in neurological status     Problem: Cardiovascular - Adult  Goal: Maintains optimal cardiac output and hemodynamic stability  Outcome: Adequate for Discharge  Flowsheets (Taken 2/6/2024 1504)  Maintains optimal cardiac output and hemodynamic stability: Monitor blood pressure and heart rate     Problem: Skin/Tissue

## 2024-02-06 NOTE — ANESTHESIA PROCEDURE NOTES
Peripheral Block    Patient location during procedure: pre-op  Reason for block: post-op pain management and at surgeon's request  Start time: 2/6/2024 8:45 AM  End time: 2/6/2024 8:50 AM  Staffing  Anesthesiologist: German Hager MD  Performed by: German Hager MD  Authorized by: German Hager MD    Preanesthetic Checklist  Completed: patient identified, IV checked, site marked, risks and benefits discussed, surgical/procedural consents, equipment checked, pre-op evaluation, timeout performed, anesthesia consent given, oxygen available and monitors applied/VS acknowledged  Peripheral Block   Patient position: supine  Prep: ChloraPrep  Provider prep: mask and sterile gloves  Patient monitoring: cardiac monitor, continuous pulse ox, frequent blood pressure checks and IV access  Block type: Femoral  Adductor canal  Laterality: right  Injection technique: single-shot  Guidance: ultrasound guided    Needle   Needle type: short-bevel   Needle gauge: 22 G  Needle localization: ultrasound guidance  Needle length: 5 cm  Assessment   Injection assessment: negative aspiration for heme, no paresthesia on injection, local visualized surrounding nerve on ultrasound and no intravascular symptoms  Paresthesia pain: none  Slow fractionated injection: yes  Hemodynamics: stable  Real-time US image taken/store: yes  Outcomes: uncomplicated and patient tolerated procedure well    Additional Notes  Sartorius and Vastus Medialis Muscle, Femoral artery and Saphenous nerve are identified; the tip of the needle and the spread of the local anesthetic around the Saphenous nerve are visualized. The Saphenous nerve appeared to be anatomically normal and there were no abnormal pathologically findings seen.   Medications Administered  ropivacaine (NAROPIN) injection 0.5% - Perineural   15 mL - 2/6/2024 8:50:00 AM

## 2024-02-06 NOTE — CARE COORDINATION
[facility]    DC order noted, all docs needed have been faxed to [facility] for home care services.    Home care to see patient within 24-48 hrs    Rivera Rivera RN, BSN CTN  [facility] [phone]

## 2024-02-06 NOTE — ANESTHESIA PROCEDURE NOTES
Peripheral Block    Patient location during procedure: pre-op  Reason for block: post-op pain management and at surgeon's request  Start time: 2/6/2024 8:40 AM  End time: 2/6/2024 8:45 AM  Staffing  Performed: anesthesiologist   Anesthesiologist: German Hager MD  Performed by: German Hager MD  Authorized by: German Hager MD    Preanesthetic Checklist  Completed: patient identified, IV checked, site marked, risks and benefits discussed, surgical/procedural consents, equipment checked, pre-op evaluation, timeout performed, anesthesia consent given, oxygen available and monitors applied/VS acknowledged  Peripheral Block   Patient position: supine  Prep: ChloraPrep  Provider prep: mask and sterile gloves  Patient monitoring: continuous pulse ox, frequent blood pressure checks, IV access, oxygen and responsive to questions  Block type: iPacks  Laterality: right  Injection technique: single-shot  Guidance: ultrasound guided    Needle   Needle type: short-bevel   Needle gauge: 20 G  Needle localization: ultrasound guidance  Needle length: 10 cm  Assessment   Injection assessment: negative aspiration for heme, no paresthesia on injection, local visualized surrounding nerve on ultrasound and no intravascular symptoms  Paresthesia pain: none  Slow fractionated injection: yes  Hemodynamics: stable  Real-time US image taken/store: yes  Outcomes: uncomplicated and patient tolerated procedure well    Additional Notes  Immediately prior to procedure a \"time out\" was called to verify the correct patient, allergies, laterality, procedure and equipment. Time out performed with Lora AGRAWAL    Local Anesthetic: 0.5 %  Ropivacaine   Amount: 15 ml  in 5 ml increments after negative aspiration each time.        Medications Administered  ropivacaine (NAROPIN) injection 0.5% - Perineural   15 mL - 2/6/2024 8:45:00 AM

## 2024-02-07 ENCOUNTER — TELEPHONE (OUTPATIENT)
Dept: ORTHOPEDIC SURGERY | Age: 79
End: 2024-02-07

## 2024-02-07 ENCOUNTER — TELEPHONE (OUTPATIENT)
Dept: ORTHOPEDICS UNIT | Age: 79
End: 2024-02-07

## 2024-02-07 NOTE — TELEPHONE ENCOUNTER
I received a voicemail from patient's wife Amanda with questions regarding the incentive spirometer. I called and spoke to Amanda and answered her questions regarding ice use and the incentive spirometer.

## 2024-02-07 NOTE — TELEPHONE ENCOUNTER
Spoke with patient regarding post discharge from hospital.    Incision status: No drainage, odor, or redness noted per patient    Edema/Swelling/Teds: edema noted, wearing teds    Pain level and status: \"sore\" 3-4/10     Use of pain medications: yes ; Patient stated they are taking their pain medication oxycodone as prescribed.     Use of ice therapy: yes     Blood thinner: aspirin 81mg ; Verified with patient that they are taking their anticoagulant as prescribed twice a day. Started this morning, his wife is administering his medications for him.    Bowels: taking stool softener tonight, passing flatus. Had small bowel movement    Home Care Agency active: yes ; Claims already worked with home therapist .  Outpatient therapy: n/a    Do you have all of your medications: yes    Changes in medications: no    He reports he completed keflex prescription as instructed.     No other questions/concerns at this time. Encouraged patient to call Orthopedic Nurse Navigator Radha Pereyra or Orthopedic office if has any questions/concerns.      Follow up appointments:    Future Appointments   Date Time Provider Department Center   2/19/2024 10:00 AM Daniel Pate MD [facility]     Electronically signed by Radha Pereyra RN on 2/7/2024 at 4:33 PM

## 2024-02-07 NOTE — TELEPHONE ENCOUNTER
,General Question     Subject: patient would like to know if he can get a letter to take to the [facility]  to see if he can get and Handicap Jose he just had a total rt knee replacement on 02/06/24 he would like to have that mail to his address that is on file.  Patient Vicente Cervantes \"Bill\"  Contact Number: [phone]

## 2024-02-14 DIAGNOSIS — M17.11 PRIMARY OSTEOARTHRITIS OF RIGHT KNEE: ICD-10-CM

## 2024-02-14 RX ORDER — OXYCODONE HYDROCHLORIDE 5 MG/1
5-10 TABLET ORAL EVERY 6 HOURS PRN
Qty: 40 TABLET | Refills: 0 | Status: SHIPPED | OUTPATIENT
Start: 2024-02-14 | End: 2024-02-19

## 2024-02-14 NOTE — TELEPHONE ENCOUNTER
Prescription Refill     Medication Name:  OXYCODONE  Pharmacy: [facility]   Patient Contact Number:  [phone]

## 2024-02-16 ENCOUNTER — TELEPHONE (OUTPATIENT)
Dept: ORTHOPEDIC SURGERY | Age: 79
End: 2024-02-16

## 2024-02-16 NOTE — TELEPHONE ENCOUNTER
Called and left message to call back. Need to know if he is taking anything    Message sent to Daniel Tanner MD Ross, Enma, MA  Caller: Unspecified (Today,  2:48 PM)  I recommend taking senna twice daily or using an enema or suppository if needed.          I called patient and left message above from Dr Pate.

## 2024-02-19 ENCOUNTER — OFFICE VISIT (OUTPATIENT)
Dept: ORTHOPEDIC SURGERY | Age: 79
End: 2024-02-19

## 2024-02-19 VITALS — WEIGHT: 185 LBS | BODY MASS INDEX: 26.48 KG/M2 | HEIGHT: 70 IN

## 2024-02-19 DIAGNOSIS — Z96.651 STATUS POST TOTAL RIGHT KNEE REPLACEMENT: Primary | ICD-10-CM

## 2024-02-19 PROCEDURE — 99024 POSTOP FOLLOW-UP VISIT: CPT | Performed by: ORTHOPAEDIC SURGERY

## 2024-02-19 NOTE — PROGRESS NOTES
[facility]  Office Visit    Chief Complaint: Follow-up s/p right total knee arthroplasty    HPI:  Vicente Cervantes is a 79 y.o. who is here in follow-up of right total knee arthroplasty performed on February 6, 2024.  He is walking with use of walker.  He takes oxycodone for pain control as needed.  He rates pain as 6/10.    Exam:  Ht 1.778 m (5' 10\")   Wt 83.9 kg (185 lb)   BMI 26.54 kg/m²      Appearance: sitting in exam room chair, appears to be in no acute distress, awake and alert  Resp: unlabored breathing on room air  Skin: warm, dry and intact with out erythema or significant increased temperature  Neuro: grossly intact both lower extremities. Intact sensation to light touch. Motor exam 4+ to 5/5 in all major motor groups.  Right knee: Incision is healing as expected.  Range of motion is 0 to 70 degrees.  Sensation is intact light touch.  There is brisk capillary refill. There is 5/5 muscle strength in all muscle groups.    Imaging:  3 views of the right knee were performed and reviewed today. Significant for total knee arthroplasty prosthesis in place with no signs of osteolysis, loosening, fracture, or dislocation.    Assessment:  S/p right total knee arthroplasty    Plan:  He continues his postoperative recovery.  I emphasized the need to continue work on knee flexion.  He will transition to outpatient physical therapy.  He will wean off the pain medication as tolerated.  Follow-up in 4 weeks for repeat assessment and range of motion check.    This dictation was done with [employer] dictation and may contain mechanical errors related to translation.

## 2024-02-21 ENCOUNTER — TELEPHONE (OUTPATIENT)
Dept: ORTHOPEDIC SURGERY | Age: 79
End: 2024-02-21

## 2024-02-21 RX ORDER — TIZANIDINE 4 MG/1
4 TABLET ORAL 3 TIMES DAILY PRN
Qty: 30 TABLET | Refills: 0 | Status: SHIPPED | OUTPATIENT
Start: 2024-02-21

## 2024-02-21 NOTE — TELEPHONE ENCOUNTER
Prescription Refill     Medication Name:  MUSCLE RELAXER  Pharmacy: [id number] - [place] [id number] [place] [phone] - F [phone]   Patient Contact Number:  ARISTEO [phone]

## 2024-02-26 NOTE — FLOWSHEET NOTE
relaxation,  increasing ROM, reducing/eliminating soft tissue swelling/inflammation/restriction, improving soft tissue extensibility and allowing for proper ROM for normal function with self care, mobility, lifting and ambulation      GOALS     Patient stated goal: to get better  Status:  [] Progressing: [] Met: [] Not Met: [] Adjusted    Therapist goals for Patient:   Short Term Goals: To be achieved in: 2 weeks  Independent in HEP and progression per patient tolerance, in order to progress toward full function and prevent re-injury.    Status: [] Progressing: [] Met: [] Not Met: [] Adjusted  Patient will have a decrease in pain to 5/10 to help facilitate improvement in movement, function, and ADLs as indicated by functional deficits.   Status: [] Progressing: [] Met: [] Not Met: [] Adjusted    Long Term Goals: To be achieved in: 8 weeks  Disability index score of 20% or less for the WOMAC to assist with return top prior level of function.   Status: [] Progressing: [] Met: [] Not Met: [] Adjusted  Improve knee AROM to 120-130 degrees or better to allow for proper joint functioning as indicated by patients functional deficits.  Status: [] Progressing: [] Met: [] Not Met: [] Adjusted  Pt to improve strength to 4+/5 or better of quadriceps to allow for proper muscle and joint use in functional mobility, ADLs and prior level of function   Status: [] Progressing: [] Met: [] Not Met: [] Adjusted  Patient will return to  Usual work, housework or activities  without increased symptoms or restriction to work towards return to prior level of function.        Status: [] Progressing: [] Met: [] Not Met: [] Adjusted  Patient will perform normal ADLs without symptoms 80% of the time             Status: [] Progressing: [] Met: [] Not Met: [] Adjusted    TREATMENT PLAN     Frequency/Duration: 2x/week for 8 weeks for the following treatment interventions:    Interventions:  [x] Therapeutic exercise including: strength training,

## 2024-02-27 ENCOUNTER — HOSPITAL ENCOUNTER (OUTPATIENT)
Dept: PHYSICAL THERAPY | Age: 79
Setting detail: THERAPIES SERIES
Discharge: HOME | End: 2024-02-27
Attending: ORTHOPAEDIC SURGERY
Payer: MEDICARE

## 2024-02-27 DIAGNOSIS — R52 PAIN: Primary | ICD-10-CM

## 2024-02-27 DIAGNOSIS — R53.1 WEAKNESS: ICD-10-CM

## 2024-02-27 DIAGNOSIS — M25.60 STIFFNESS IN JOINT: ICD-10-CM

## 2024-02-27 DIAGNOSIS — R26.89 DECREASED FUNCTIONAL MOBILITY: ICD-10-CM

## 2024-02-27 PROCEDURE — 97140 MANUAL THERAPY 1/> REGIONS: CPT

## 2024-02-27 PROCEDURE — 97161 PT EVAL LOW COMPLEX 20 MIN: CPT

## 2024-02-27 PROCEDURE — 97110 THERAPEUTIC EXERCISES: CPT

## 2024-02-29 ENCOUNTER — HOSPITAL ENCOUNTER (OUTPATIENT)
Dept: PHYSICAL THERAPY | Age: 79
Setting detail: THERAPIES SERIES
Discharge: HOME | End: 2024-02-29
Attending: ORTHOPAEDIC SURGERY
Payer: MEDICARE

## 2024-02-29 DIAGNOSIS — M17.11 PRIMARY OSTEOARTHRITIS OF RIGHT KNEE: ICD-10-CM

## 2024-02-29 PROCEDURE — 97110 THERAPEUTIC EXERCISES: CPT

## 2024-02-29 PROCEDURE — 97140 MANUAL THERAPY 1/> REGIONS: CPT

## 2024-02-29 RX ORDER — NAPROXEN 500 MG/1
500 TABLET ORAL 2 TIMES DAILY WITH MEALS
Qty: 60 TABLET | Refills: 3 | Status: SHIPPED | OUTPATIENT
Start: 2024-02-29

## 2024-02-29 RX ORDER — OXYCODONE HYDROCHLORIDE 5 MG/1
5-10 TABLET ORAL EVERY 6 HOURS PRN
Qty: 40 TABLET | Refills: 0 | Status: SHIPPED | OUTPATIENT
Start: 2024-02-29 | End: 2024-03-05

## 2024-02-29 NOTE — FLOWSHEET NOTE
and aching  Pain decreases with: Resting and Ice  Pain increases with: Standing, Prolonged standing, and Prolonged walking     Relevant Medical History: HTN    Occupation/School:  Work/School Status: Retired  Job Duties/Demands: NA    Sport/ Recreation/ Leisure/ Hobbies:     OBJECTIVE EXAMINATION       SURGERY:     Scheduled Date: R TKA     2/6/24   Hospital D/C  recommendations outpatient therapy     Comments:      Functional Testing Prehab  Date 01/16/2024 Post-op Re-Eval Date  6 weeks from surgery  Date  D/C   Date        Current AD use None         TUG (sec)         X             30 second sit to stand w/out UE (reps)   X       Gait speed (m/s)  4x10m fast walk  Result = 40m/total time               Supine Hip/Knee AROM L R L R L R L R   Hip Flexion                   Knee Flexion 120 100               Knee Extension   Lacking 7                   WOMAC (raw) 53/96               ROM/Strength: (Blank cells denote NT)       2/27            KNEE Flexion 68/ 90p           Extension -15a/ -10 p                                     MMT R Notes         HIP Flexion 4+/5      Abduction       ER       IR       Extension               KNEE Flexion 3+      Extension 3+       Gait - antalgic stiff legged gait with SPC.  Pt educated on offloading with RW.  Able to demonstrate better quality gait with RW, noted it felt better.      Exercises/Interventions     Therapeutic Ex (48448)  resistance Sets/time Reps Notes/Cues/Progressions       Right TKR 2/6/24   Nustep seat 8-9  6 min     HSS  30 sec 4    Knee flexion  5 sec  20 2/29  Flexion to 90 deg   Fitter  F/B    10           Standing  Knee flexion         10    Step ups 4\"   10    Supine  Heel slides with belt  SLR  SAQ       2#   1  1  2   10  10  10           Leg Press  seat  8, then 9  30 2 10    Seated FAQ 1#  1 10    Seated knee flexion orange 1 10                  Manual Intervention (14814)  TIME     Seated PROM post nustep on black chair  10            Supine PROM

## 2024-03-01 ENCOUNTER — HOSPITAL ENCOUNTER (OUTPATIENT)
Dept: PHYSICAL THERAPY | Age: 79
Setting detail: THERAPIES SERIES
Discharge: HOME | End: 2024-03-01
Attending: ORTHOPAEDIC SURGERY
Payer: MEDICARE

## 2024-03-01 PROCEDURE — 97110 THERAPEUTIC EXERCISES: CPT

## 2024-03-01 PROCEDURE — 97140 MANUAL THERAPY 1/> REGIONS: CPT

## 2024-03-01 NOTE — FLOWSHEET NOTE
Adjusted  Patient will return to  Usual work, housework or activities  without increased symptoms or restriction to work towards return to prior level of function.        Status: [] Progressing: [] Met: [] Not Met: [] Adjusted  Patient will perform normal ADLs without symptoms 80% of the time             Status: [] Progressing: [] Met: [] Not Met: [] Adjusted    TREATMENT PLAN     Frequency/Duration: 2x/week for 8 weeks for the following treatment interventions:    Interventions:  [x] Therapeutic exercise including: strength training, ROM, including postural re-education.   [x] NMR activation and proprioception, including postural re-education.    [x] Manual therapy as indicated to include: PROM, Gr I-IV mobilizations, and STM  [x] Modalities as needed that may include: Cryotherapy  [x] Patient education on joint protection, postural re-education, activity modification, progression of HEP.        [] Aquatic Therapy    Plan: POC initiated as per evaluation    Electronically Signed by Christian Mayfield PT  DPT  Date: 03/01/2024    Note: Portions of this note have been templated and/or copied from initial evaluation, reassessments and prior notes for documentation efficiency.

## 2024-03-04 ENCOUNTER — TELEPHONE (OUTPATIENT)
Dept: ORTHOPEDIC SURGERY | Age: 79
End: 2024-03-04

## 2024-03-04 NOTE — TELEPHONE ENCOUNTER
General Question     Subject: Patient wife Amanda is calling she stated her  had sx on 02/06/24 on his Rt Knee she stated he is having some warmness in that knee she just wanted to know if that is normal after sx. Please Advise.  Patient Vicente Cervantes \"Bill\"   Contact Number: [phone]

## 2024-03-05 ENCOUNTER — HOSPITAL ENCOUNTER (OUTPATIENT)
Dept: PHYSICAL THERAPY | Age: 79
Setting detail: THERAPIES SERIES
Discharge: HOME | End: 2024-03-05
Attending: ORTHOPAEDIC SURGERY
Payer: MEDICARE

## 2024-03-05 PROCEDURE — 97110 THERAPEUTIC EXERCISES: CPT

## 2024-03-05 PROCEDURE — 97140 MANUAL THERAPY 1/> REGIONS: CPT

## 2024-03-05 NOTE — FLOWSHEET NOTE
participation restrictions  [x] A clinical presentation with stable and/or uncomplicated characteristics   [x] Clinical decision making of LOW (40144 - Typically 20 minutes face-to-face) complexity using standardized patient assessment instrument and/or measurable assessment of functional outcome.    03/01 -  Today's Assessment: During therapy this date, patient required verbal cueing and tactile cueing for exercise progression, promoting relaxation, and increasing ROM.Patient will continue to benefit from ongoing evaluation and advanced clinical decision from a Physical Therapist to address and improve ROM and muscle strength to safely return to PLOF without symptoms or restrictions. and Lenny exhibited good tolerance to treatment today as his baseline pain was less -  ROM is still very limited and tight.  Reviewed importance of maintaining ROM at home so we can push it in the clinic,.  Pt expressed understanding.  Pt needs skilled therapies to maximize his ROM and tissue flexibility.      Medical Necessity Documentation:  I certify that this patient meets the below criteria necessary for medical necessity for care and/or justification of therapy services:  The patient has functional impairments and/or activity limitations and would benefit from continued outpatient therapy services to address the deficits outlined in the patients goals      Return to Play: NA    Prognosis for POC: [x] Good [] Fair  [] Poor    Patient requires continued skilled intervention: [x] Yes  [] No      CHARGE CAPTURE     PT CHARGE GRID   CPT Code (TIMED) minutes #    Therex (41431)  3 2    Neuromusc. Re-ed (63792)      Manual (70070) 23 2    Ther. Act (90547)      Gait (82991)      Aquatic Therex (79897)      Iontophoresis (29145)      Ultrasound (88785)      Estim Attended (92094)      Other:     Total Timed Code Tx Minutes 55 4     Total Treatment Minutes 55        Charge Justification:  (83730) THERAPEUTIC EXERCISE - Provided

## 2024-03-07 ENCOUNTER — HOSPITAL ENCOUNTER (OUTPATIENT)
Dept: PHYSICAL THERAPY | Age: 79
Setting detail: THERAPIES SERIES
Discharge: HOME | End: 2024-03-07
Attending: ORTHOPAEDIC SURGERY
Payer: MEDICARE

## 2024-03-07 ENCOUNTER — TELEPHONE (OUTPATIENT)
Dept: ORTHOPEDIC SURGERY | Age: 79
End: 2024-03-07

## 2024-03-07 PROCEDURE — 97110 THERAPEUTIC EXERCISES: CPT

## 2024-03-07 PROCEDURE — 97140 MANUAL THERAPY 1/> REGIONS: CPT

## 2024-03-07 NOTE — FLOWSHEET NOTE
[facility]- Outpatient Rehabilitation and Therapy [address] [place] [address] office: [phone] fax: [phone]         Physical Therapy: TREATMENT/PROGRESS NOTE   Patient: Vicente Cervantes (79 y.o. male)   Examination Date: 2024   :  1945 MRN: [id number]   Visit #: 5 Manual Entry3/7/24  Insurance Allowable Auth Needed   BCBS Medicare []Yes    [x]No    Insurance: Payor: BCBS MEDICARE / Plan: ANTHEM MEDIBLUE ESSENTIAL/PLUS / Product Type: *No Product type* /   Insurance ID: [id number] - (Medicare Managed)  Secondary Insurance (if applicable):    Treatment Diagnosis:     ICD-10-CM    1. Pain  R52       2. Stiffness in joint  M25.60       3. Weakness  R53.1       4. 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   [] Yes, Patient intake triggered further evaluation      [] C-SSRS Screening completed  [] PCP notified via Plan of Care  [] Emergency services notified     Preferred Language for Healthcare:   [x] English       [] other:    SUBJECTIVE EXAMINATION   Relevant Medical History: HTN     Test used Initial score  2024   Pain Summary VAS 9-10 3-4/10   Functional questionnaire WOMAC 41    Other:                Patient stated complaint:   Right TKA 23.     - Patient stated complaint:   Right TKA 23 -

## 2024-03-07 NOTE — TELEPHONE ENCOUNTER
[facility]-  JOSEPH Beckwith Burton   [phone] Cell  Called and want to discuss getting the patient in for a R Knee manipulation

## 2024-03-08 ENCOUNTER — APPOINTMENT (OUTPATIENT)
Dept: PHYSICAL THERAPY | Age: 79
End: 2024-03-08
Attending: ORTHOPAEDIC SURGERY
Payer: MEDICARE

## 2024-03-08 ENCOUNTER — OFFICE VISIT (OUTPATIENT)
Dept: ORTHOPEDIC SURGERY | Age: 79
End: 2024-03-08

## 2024-03-08 VITALS — HEIGHT: 70 IN | WEIGHT: 179 LBS | BODY MASS INDEX: 25.62 KG/M2

## 2024-03-08 DIAGNOSIS — Z96.651 STATUS POST TOTAL RIGHT KNEE REPLACEMENT: Primary | ICD-10-CM

## 2024-03-08 PROCEDURE — 99024 POSTOP FOLLOW-UP VISIT: CPT | Performed by: ORTHOPAEDIC SURGERY

## 2024-03-08 NOTE — PROGRESS NOTES
[facility] Orthopaedics and Spine  Office Visit    Chief Complaint: Follow-up s/p right total knee arthroplasty    HPI:  Vicente Cervantes is a 79 y.o. who is here in follow-up of right total knee arthroplasty performed on February 6, 2024.  He is walking with use of walker.  He takes oxycodone for pain control as needed.  He continues to work with physical therapy.  He rates pain as 5/10 today.    Exam:  Ht 1.778 m (5' 10\")   Wt 81.2 kg (179 lb)   BMI 25.68 kg/m²      Appearance: sitting in exam room chair, appears to be in no acute distress, awake and alert  Resp: unlabored breathing on room air  Skin: warm, dry and intact with out erythema or significant increased temperature  Neuro: grossly intact both lower extremities. Intact sensation to light touch. Motor exam 4+ to 5/5 in all major motor groups.  Right knee: Incision is old.  Range of motion is 5 to 95 degrees.  Sensation is intact light touch.  There is brisk capillary refill. There is 5/5 muscle strength in all muscle groups.    Imaging:  None    Assessment:  S/p right total knee arthroplasty    Plan:  He continues his postoperative recovery.  His flexion has improved since his last appointment 2 weeks ago.  He is only 4.5 weeks out from surgery.  I recommended that he continue to work with physical therapy and come in in 2 weeks for repeat assessment to see how much his flexion has improved.  We may consider manipulation at that time if he remains stiff.    This dictation was done with [employer] dictation and may contain mechanical errors related to translation.

## 2024-03-12 ENCOUNTER — HOSPITAL ENCOUNTER (OUTPATIENT)
Dept: PHYSICAL THERAPY | Age: 79
Setting detail: THERAPIES SERIES
Discharge: HOME | End: 2024-03-12
Attending: ORTHOPAEDIC SURGERY
Payer: MEDICARE

## 2024-03-12 PROCEDURE — 97140 MANUAL THERAPY 1/> REGIONS: CPT

## 2024-03-12 PROCEDURE — 97110 THERAPEUTIC EXERCISES: CPT

## 2024-03-14 ENCOUNTER — HOSPITAL ENCOUNTER (OUTPATIENT)
Dept: PHYSICAL THERAPY | Age: 79
Setting detail: THERAPIES SERIES
Discharge: HOME | End: 2024-03-14
Attending: ORTHOPAEDIC SURGERY
Payer: MEDICARE

## 2024-03-14 PROCEDURE — 97110 THERAPEUTIC EXERCISES: CPT

## 2024-03-14 PROCEDURE — 97140 MANUAL THERAPY 1/> REGIONS: CPT

## 2024-03-14 PROCEDURE — 97530 THERAPEUTIC ACTIVITIES: CPT

## 2024-03-14 NOTE — FLOWSHEET NOTE
Date: 03/14/2024    Note: Portions of this note have been templated and/or copied from initial evaluation, reassessments and prior notes for documentation efficiency.

## 2024-03-15 ENCOUNTER — HOSPITAL ENCOUNTER (OUTPATIENT)
Dept: PHYSICAL THERAPY | Age: 79
Setting detail: THERAPIES SERIES
Discharge: HOME | End: 2024-03-15
Attending: ORTHOPAEDIC SURGERY
Payer: MEDICARE

## 2024-03-15 PROCEDURE — 97530 THERAPEUTIC ACTIVITIES: CPT

## 2024-03-15 PROCEDURE — 97110 THERAPEUTIC EXERCISES: CPT

## 2024-03-15 PROCEDURE — 97140 MANUAL THERAPY 1/> REGIONS: CPT

## 2024-03-15 NOTE — FLOWSHEET NOTE
[facility]- Outpatient Rehabilitation and Therapy [address] [place] [address] office: [phone] fax: [phone]     Physical Therapy: TREATMENT/PROGRESS NOTE   Patient: Vicente Cervantes (79 y.o. male)   Examination Date: 03/15/2024   :  1945 MRN: [id number]   Visit #: 8 Manual Entry3/15/24  Insurance Allowable Auth Needed   BCBS Medicare []Yes    [x]No    Insurance: Payor: BCBS MEDICARE / Plan: ANTHEM MEDIBLUE ESSENTIAL/PLUS / Product Type: *No Product type* /   Insurance ID: [id number] - (Medicare Managed)  Secondary Insurance (if applicable):    Treatment Diagnosis:     ICD-10-CM    1. Pain  R52       2. Stiffness in joint  M25.60       3. Weakness  R53.1       4. 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   [] Yes, Patient intake triggered further evaluation      [] C-SSRS Screening completed  [] PCP notified via Plan of Care  [] Emergency services notified     Preferred Language for Healthcare:   [x] English       [] other:    SUBJECTIVE EXAMINATION   Relevant Medical History: HTN     Test used Initial score  2024   Pain Summary VAS 9-10 3-4/10   Functional questionnaire WOMAC 41    Other:            MD note 3/8/24  He continues his postoperative recovery.  His flexion has improved since his last

## 2024-03-18 ENCOUNTER — OFFICE VISIT (OUTPATIENT)
Dept: ORTHOPEDIC SURGERY | Age: 79
End: 2024-03-18

## 2024-03-18 VITALS — BODY MASS INDEX: 25.62 KG/M2 | HEIGHT: 70 IN | WEIGHT: 179 LBS

## 2024-03-18 DIAGNOSIS — Z96.651 STATUS POST TOTAL RIGHT KNEE REPLACEMENT: Primary | ICD-10-CM

## 2024-03-18 PROCEDURE — 99024 POSTOP FOLLOW-UP VISIT: CPT | Performed by: ORTHOPAEDIC SURGERY

## 2024-03-18 RX ORDER — OXYCODONE HYDROCHLORIDE 5 MG/1
5-10 TABLET ORAL EVERY 6 HOURS PRN
Qty: 40 TABLET | Refills: 0 | Status: SHIPPED | OUTPATIENT
Start: 2024-03-18 | End: 2024-03-25

## 2024-03-18 NOTE — PROGRESS NOTES
[facility] Orthopaedics and Spine  Office Visit    Chief Complaint: Follow-up s/p right total knee arthroplasty    HPI:  Vicente Cervantes is a 79 y.o. who is here in follow-up of right total knee arthroplasty performed on February 6, 2024.  He is walking with use of a cane.  He takes oxycodone for pain control as needed.  He continues to work with physical therapy.  He rates pain as 4/10 today.  He feels that he is improving.    Exam:  Ht 1.778 m (5' 10\")   Wt 81.2 kg (179 lb)   BMI 25.68 kg/m²      Appearance: sitting in exam room chair, appears to be in no acute distress, awake and alert  Resp: unlabored breathing on room air  Skin: warm, dry and intact with out erythema or significant increased temperature  Neuro: grossly intact both lower extremities. Intact sensation to light touch. Motor exam 4+ to 5/5 in all major motor groups.  Right knee: Incision is healed.  Range of motion is 5 to 105 degrees.  Sensation is intact light touch.  There is brisk capillary refill. There is 5/5 muscle strength in all muscle groups.    Imaging:  None    Assessment:  S/p right total knee arthroplasty    Plan:  He continues his postoperative recovery.  His flexion has improved since his last appointment 2 weeks ago.  His preoperative flexion was 100 degrees.  He has already gotten to this point.  He should continue to improve with continued physical therapy.  We discussed that preoperative stiffness resulting postoperative stiffness.  Follow-up in 4 weeks for repeat assessment and radiographs.    This dictation was done with [employer] dictation and may contain mechanical errors related to translation.

## 2024-03-19 ENCOUNTER — HOSPITAL ENCOUNTER (OUTPATIENT)
Dept: PHYSICAL THERAPY | Age: 79
Setting detail: THERAPIES SERIES
Discharge: HOME | End: 2024-03-19
Attending: ORTHOPAEDIC SURGERY
Payer: MEDICARE

## 2024-03-19 PROCEDURE — 97110 THERAPEUTIC EXERCISES: CPT

## 2024-03-19 PROCEDURE — 97140 MANUAL THERAPY 1/> REGIONS: CPT

## 2024-03-19 NOTE — FLOWSHEET NOTE
[facility]- Outpatient [facility] Therapy [address] [place] [address] office: [phone] fax: [phone]     Physical Therapy: TREATMENT/PROGRESS NOTE   Patient: Vicente Cervantes (79 y.o. male)   Examination Date: 2024   :  1945 MRN: [id number]   Visit #: 9  Manual Entry3/19/24  Insurance Allowable Auth Needed   BCBS Medicare []Yes    [x]No    Insurance: Payor: BCBS MEDICARE / Plan: ANTHEM MEDIBLUE ESSENTIAL/PLUS / Product Type: *No Product type* /   Insurance ID: [id number] - (Medicare Managed)  Secondary Insurance (if applicable):    Treatment Diagnosis:     ICD-10-CM    1. Pain  R52       2. Stiffness in joint  M25.60       3. Weakness  R53.1       4. 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  His flexion has improved since his last appointment 2 weeks ago.  He is only 4.5 weeks out from surgery.  I recommended that he continue to work with physical therapy and come in in 2 weeks for repeat assessment to see how much his flexion has improved.  We may consider manipulation at that time if he remains stiff.       Patient stated complaint:   Right TKA 23.     - Patient stated complaint:   Right TKA 23 - lots of pain  /10  3/01/24: Patient to skilled therapy stating he took two oxy \"about 20 minutes\" before coming in this AM.   3/5  -

## 2024-03-21 ENCOUNTER — HOSPITAL ENCOUNTER (OUTPATIENT)
Dept: PHYSICAL THERAPY | Age: 79
Setting detail: THERAPIES SERIES
Discharge: HOME | End: 2024-03-21
Attending: ORTHOPAEDIC SURGERY
Payer: MEDICARE

## 2024-03-21 PROCEDURE — 97140 MANUAL THERAPY 1/> REGIONS: CPT

## 2024-03-21 PROCEDURE — 97530 THERAPEUTIC ACTIVITIES: CPT

## 2024-03-21 PROCEDURE — 97110 THERAPEUTIC EXERCISES: CPT

## 2024-03-21 NOTE — FLOWSHEET NOTE
restrictions.    Medical Necessity Documentation:  I certify that this patient meets the below criteria necessary for medical necessity for care and/or justification of therapy services:  The patient has functional impairments and/or activity limitations and would benefit from continued outpatient therapy services to address the deficits outlined in the patients goals      Return to Play: NA    Prognosis for POC: [x] Good [] Fair  [] Poor    Patient requires continued skilled intervention: [x] Yes  [] No      CHARGE CAPTURE     PT CHARGE GRID   CPT Code (TIMED) minutes #    Therex (72966)  30 2    Neuromusc. Re-ed (32738)      Manual (93780) 10 1    Ther. Act (95531) 15 1    Gait (12953)      Aquatic Therex (89768)      Iontophoresis (99744)      Ultrasound (07107)      Estim Attended (18518)      Other:     Total Timed Code Tx Minutes 55 3     Total Treatment Minutes 55        Charge Justification:  (39376) THERAPEUTIC EXERCISE - Provided verbal/tactile cueing for activities related to strengthening, flexibility, endurance, ROM performed to prevent loss of range of motion, maintain or improve muscular strength or increase flexibility, following either an injury or surgery.   (75394) HOME EXERCISE PROGRAM - Reviewed/Progressed HEP activities related to strengthening, flexibility, endurance, ROM performed to prevent loss of range of motion, maintain or improve muscular strength or increase flexibility, following either an injury or surgery.  (31431) THERAPEUTIC ACTIVITY - use of dynamic activities to improve functional performance. (Ex include squatting, ascending/descending stairs, walking, bending, lifting, catching, throwing, pushing, pulling, jumping.)  Direct, one on one contact, billed in 15-minute increments.  (68313) MANUAL THERAPY -  Manual therapy techniques, 1 or more regions, each 15 minutes (Mobilization/manipulation, manual lymphatic drainage, manual traction) for the purpose of modulating pain,

## 2024-03-22 ENCOUNTER — HOSPITAL ENCOUNTER (OUTPATIENT)
Dept: PHYSICAL THERAPY | Age: 79
Setting detail: THERAPIES SERIES
Discharge: HOME | End: 2024-03-22
Attending: ORTHOPAEDIC SURGERY
Payer: MEDICARE

## 2024-03-22 PROCEDURE — 97140 MANUAL THERAPY 1/> REGIONS: CPT

## 2024-03-22 PROCEDURE — 97110 THERAPEUTIC EXERCISES: CPT

## 2024-03-22 NOTE — FLOWSHEET NOTE
outlined in the patients goals      Return to Play: NA    Prognosis for POC: [x] Good [] Fair  [] Poor    Patient requires continued skilled intervention: [x] Yes  [] No      CHARGE CAPTURE     PT CHARGE GRID   CPT Code (TIMED) minutes #    Therex (57915)  30 2    Neuromusc. Re-ed (45393)      Manual (82377) 10 1    Ther. Act (63227)      Gait (03965)      Aquatic Therex (63099)      Iontophoresis (01896)      Ultrasound (18252)      Estim Attended (56145)      Other:     Total Timed Code Tx Minutes 40 3     Total Treatment Minutes 40        Charge Justification:  (29801) THERAPEUTIC EXERCISE - Provided verbal/tactile cueing for activities related to strengthening, flexibility, endurance, ROM performed to prevent loss of range of motion, maintain or improve muscular strength or increase flexibility, following either an injury or surgery.   (97573) MANUAL THERAPY -  Manual therapy techniques, 1 or more regions, each 15 minutes (Mobilization/manipulation, manual lymphatic drainage, manual traction) for the purpose of modulating pain, promoting relaxation,  increasing ROM, reducing/eliminating soft tissue swelling/inflammation/restriction, improving soft tissue extensibility and allowing for proper ROM for normal function with self care, mobility, lifting and ambulation      GOALS     Patient stated goal: to get better  Status:  [x] Progressing: [] Met: [] Not Met: [] Adjusted    Therapist goals for Patient:   Short Term Goals: To be achieved in: 2 weeks  Independent in HEP and progression per patient tolerance, in order to progress toward full function and prevent re-injury.    Status: [x] Progressing: [] Met: [] Not Met: [] Adjusted  Patient will have a decrease in pain to 5/10 to help facilitate improvement in movement, function, and ADLs as indicated by functional deficits.   Status: [x] Progressing: [] Met: [] Not Met: [] Adjusted    Long Term Goals: To be achieved in: 8 weeks  Disability index score of 20% or

## 2024-03-26 ENCOUNTER — HOSPITAL ENCOUNTER (OUTPATIENT)
Dept: PHYSICAL THERAPY | Age: 79
Setting detail: THERAPIES SERIES
Discharge: HOME | End: 2024-03-26
Attending: ORTHOPAEDIC SURGERY
Payer: MEDICARE

## 2024-03-26 PROCEDURE — 97140 MANUAL THERAPY 1/> REGIONS: CPT

## 2024-03-26 PROCEDURE — 97530 THERAPEUTIC ACTIVITIES: CPT

## 2024-03-26 PROCEDURE — 97110 THERAPEUTIC EXERCISES: CPT

## 2024-03-26 NOTE — FLOWSHEET NOTE
[facility]- Outpatient Rehabilitation and Therapy [address] [place] [address] office: [phone] fax: [phone]     Physical Therapy: TREATMENT/PROGRESS NOTE   Patient: Vicente Cervantes (79 y.o. male)   Examination Date: 2024   :  1945 MRN: [id number]   Visit #: 12  Manual Entry3/19/24  Insurance Allowable Auth Needed   BCBS Medicare []Yes    [x]No    Insurance: Payor: BCBS MEDICARE / Plan: ANTHEM MEDIBLUE ESSENTIAL/PLUS / Product Type: *No Product type* /   Insurance ID: [id number] - (Medicare Managed)  Secondary Insurance (if applicable):    Treatment Diagnosis:     ICD-10-CM    1. Pain  R52       2. Stiffness in joint  M25.60       3. Weakness  R53.1       4. 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  His flexion has improved since his last appointment 2 weeks ago.  He is only 4.5 weeks out from surgery.  I recommended that he continue to work with physical therapy and come in in 2 weeks for repeat assessment to see how much his flexion has improved.  We may consider manipulation at that time if he remains stiff.       Patient stated complaint:   Right TKA 23.     - Patient stated complaint:   Right TKA 23 - lots of pain  9/10  3/01/24: Patient to skilled therapy stating he took two oxy \"about 20 minutes\" before coming

## 2024-03-28 ENCOUNTER — HOSPITAL ENCOUNTER (OUTPATIENT)
Dept: PHYSICAL THERAPY | Age: 79
Setting detail: THERAPIES SERIES
Discharge: HOME | End: 2024-03-28
Attending: ORTHOPAEDIC SURGERY
Payer: MEDICARE

## 2024-03-28 PROCEDURE — 97110 THERAPEUTIC EXERCISES: CPT

## 2024-03-28 PROCEDURE — 97530 THERAPEUTIC ACTIVITIES: CPT

## 2024-03-28 PROCEDURE — 97140 MANUAL THERAPY 1/> REGIONS: CPT

## 2024-03-28 NOTE — FLOWSHEET NOTE
[facility]- Outpatient Rehabilitation and Therapy [address] [place] [address] office: [phone] fax: [phone]     Physical Therapy: TREATMENT/PROGRESS NOTE   Patient: Vicente Cervantes (79 y.o. male)   Examination Date: 2024   :  1945 MRN: [id number]   Visit #: 13  Manual Entry3/28  Insurance Allowable Auth Needed   BCBS Medicare []Yes    [x]No    Insurance: Payor: BCBS MEDICARE / Plan: ANTHEM MEDIBLUE ESSENTIAL/PLUS / Product Type: *No Product type* /   Insurance ID: [id number] - (Medicare Managed)  Secondary Insurance (if applicable):    Treatment Diagnosis:     ICD-10-CM    1. Pain  R52       2. Stiffness in joint  M25.60       3. Weakness  R53.1       4. 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  His flexion has improved since his last appointment 2 weeks ago.  He is only 4.5 weeks out from surgery.  I recommended that he continue to work with physical therapy and come in in 2 weeks for repeat assessment to see how much his flexion has improved.  We may consider manipulation at that time if he remains stiff.       Patient stated complaint:   Right TKA 23.     - Patient stated complaint:   Right TKA 23 - lots of pain  9/10  3/01/24: Patient to skilled therapy stating he took two oxy \"about 20 minutes\" before coming in

## 2024-04-01 ENCOUNTER — HOSPITAL ENCOUNTER (OUTPATIENT)
Dept: PHYSICAL THERAPY | Age: 79
Setting detail: THERAPIES SERIES
Discharge: HOME | End: 2024-04-01
Attending: ORTHOPAEDIC SURGERY
Payer: MEDICARE

## 2024-04-01 PROCEDURE — 97140 MANUAL THERAPY 1/> REGIONS: CPT

## 2024-04-01 PROCEDURE — 97110 THERAPEUTIC EXERCISES: CPT

## 2024-04-01 PROCEDURE — 97530 THERAPEUTIC ACTIVITIES: CPT

## 2024-04-01 NOTE — FLOWSHEET NOTE
[facility]- Outpatient Rehabilitation and Therapy [address] [place] [address] office: [phone] fax: [phone]     Physical Therapy: TREATMENT/PROGRESS NOTE   Patient: Vicente Cervantes (79 y.o. male)   Examination Date: 2024   :  1945 MRN: [id number]   Visit #: 14  Manual Entry 3/28  Insurance Allowable Auth Needed   BCBS Medicare []Yes    [x]No    Insurance: Payor: BCBS MEDICARE / Plan: ANTHEM MEDIBLUE ESSENTIAL/PLUS / Product Type: *No Product type* /   Insurance ID: [id number] - (Medicare Managed)  Secondary Insurance (if applicable):    Treatment Diagnosis:     ICD-10-CM    1. Pain  R52       2. Stiffness in joint  M25.60       3. Weakness  R53.1       4. 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  His flexion has improved since his last appointment 2 weeks ago.  He is only 4.5 weeks out from surgery.  I recommended that he continue to work with physical therapy and come in in 2 weeks for repeat assessment to see how much his flexion has improved.  We may consider manipulation at that time if he remains stiff.       Patient stated complaint:   Right TKA 23.     - Patient stated complaint:   Right TKA 23 - lots of pain  9/10  3/01/24: Patient to skilled therapy stating he took two oxy \"about 20 minutes\" before coming in

## 2024-04-03 ENCOUNTER — HOSPITAL ENCOUNTER (OUTPATIENT)
Dept: PHYSICAL THERAPY | Age: 79
Setting detail: THERAPIES SERIES
Discharge: HOME | End: 2024-04-03
Attending: ORTHOPAEDIC SURGERY
Payer: MEDICARE

## 2024-04-03 PROCEDURE — 97110 THERAPEUTIC EXERCISES: CPT

## 2024-04-03 PROCEDURE — 97140 MANUAL THERAPY 1/> REGIONS: CPT

## 2024-04-03 NOTE — FLOWSHEET NOTE
[facility]- Outpatient Rehabilitation and Therapy [address] [place] [address] office: [phone] fax: [phone]     Physical Therapy: TREATMENT/PROGRESS NOTE   Patient: Vicente Cervantes (79 y.o. male)   Examination Date: 2024   :  1945 MRN: [id number]   Visit #: 15  Manual Entry 3/28  Insurance Allowable Auth Needed   BCBS Medicare []Yes    [x]No    Insurance: Payor: BCBS MEDICARE / Plan: ANTHEM MEDIBLUE ESSENTIAL/PLUS / Product Type: *No Product type* /   Insurance ID: [id number] - (Medicare Managed)  Secondary Insurance (if applicable):    Treatment Diagnosis:     ICD-10-CM    1. Pain  R52       2. Stiffness in joint  M25.60       3. Weakness  R53.1       4. 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  His flexion has improved since his last appointment 2 weeks ago.  He is only 4.5 weeks out from surgery.  I recommended that he continue to work with physical therapy and come in in 2 weeks for repeat assessment to see how much his flexion has improved.  We may consider manipulation at that time if he remains stiff.       Patient stated complaint:   Right TKA 23.     - Patient stated complaint:   Right TKA 23 - lots of pain  9/10  3/01/24: Patient to skilled therapy stating he took two oxy \"about 20 minutes\" before coming in

## 2024-04-05 ENCOUNTER — HOSPITAL ENCOUNTER (OUTPATIENT)
Dept: PHYSICAL THERAPY | Age: 79
Setting detail: THERAPIES SERIES
Discharge: HOME | End: 2024-04-05
Attending: ORTHOPAEDIC SURGERY
Payer: MEDICARE

## 2024-04-05 PROCEDURE — 97110 THERAPEUTIC EXERCISES: CPT

## 2024-04-05 PROCEDURE — 97140 MANUAL THERAPY 1/> REGIONS: CPT

## 2024-04-05 PROCEDURE — 97530 THERAPEUTIC ACTIVITIES: CPT

## 2024-04-05 NOTE — FLOWSHEET NOTE
[facility]- Outpatient Rehabilitation and Therapy [address] [place] [address] office: [phone] fax: [phone]     Physical Therapy: TREATMENT/PROGRESS NOTE   Patient: Vicente Cervantes (79 y.o. male)   Examination Date: 2024   :  1945 MRN: [id number]   Visit #: 16  Manual Entry 3/28, KX  Insurance Allowable Auth Needed   BCBS Medicare []Yes    [x]No    Insurance: Payor: BCBS MEDICARE / Plan: ANTHEM MEDIBLUE ESSENTIAL/PLUS / Product Type: *No Product type* /   Insurance ID: [id number] - (Medicare Managed)  Secondary Insurance (if applicable):    Treatment Diagnosis:     ICD-10-CM    1. Pain  R52       2. Stiffness in joint  M25.60       3. Weakness  R53.1       4. 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  His flexion has improved since his last appointment 2 weeks ago.  He is only 4.5 weeks out from surgery.  I recommended that he continue to work with physical therapy and come in in 2 weeks for repeat assessment to see how much his flexion has improved.  We may consider manipulation at that time if he remains stiff.       Patient stated complaint:   Right TKA 23.     - Patient stated complaint:   Right TKA 23 - lots of pain  9/10  3/01/24: Patient to skilled therapy stating he took two oxy \"about 20 minutes\" before coming

## 2024-04-08 ENCOUNTER — HOSPITAL ENCOUNTER (OUTPATIENT)
Dept: PHYSICAL THERAPY | Age: 79
Setting detail: THERAPIES SERIES
Discharge: HOME | End: 2024-04-08
Attending: ORTHOPAEDIC SURGERY
Payer: MEDICARE

## 2024-04-08 PROCEDURE — 97140 MANUAL THERAPY 1/> REGIONS: CPT

## 2024-04-08 PROCEDURE — 97110 THERAPEUTIC EXERCISES: CPT

## 2024-04-08 PROCEDURE — 97530 THERAPEUTIC ACTIVITIES: CPT

## 2024-04-08 NOTE — FLOWSHEET NOTE
[facility]- Outpatient Rehabilitation and Therapy [address] [place] [address] office: [phone] fax: [phone]     Physical Therapy: TREATMENT/PROGRESS NOTE   Patient: Vicente Cervantes (79 y.o. male)   Examination Date: 2024   :  1945 MRN: [id number]   Visit #: 17  Manual Entry 3/28, KX  Insurance Allowable Auth Needed   BCBS Medicare []Yes    [x]No    Insurance: Payor: BCBS MEDICARE / Plan: ANTHEM MEDIBLUE ESSENTIAL/PLUS / Product Type: *No Product type* /   Insurance ID: [id number] - (Medicare Managed)  Secondary Insurance (if applicable):    Treatment Diagnosis:     ICD-10-CM    1. Pain  R52       2. Stiffness in joint  M25.60       3. Weakness  R53.1       4. 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  His flexion has improved since his last appointment 2 weeks ago.  He is only 4.5 weeks out from surgery.  I recommended that he continue to work with physical therapy and come in in 2 weeks for repeat assessment to see how much his flexion has improved.  We may consider manipulation at that time if he remains stiff.       Patient stated complaint:   Right TKA 23.     - Patient stated complaint:   Right TKA 23 - lots of pain  /10  3/01/24: Patient to skilled therapy stating he took two oxy \"about 20 minutes\" before coming

## 2024-04-09 ENCOUNTER — APPOINTMENT (OUTPATIENT)
Dept: PHYSICAL THERAPY | Age: 79
End: 2024-04-09
Attending: ORTHOPAEDIC SURGERY
Payer: MEDICARE

## 2024-04-10 ENCOUNTER — HOSPITAL ENCOUNTER (OUTPATIENT)
Dept: PHYSICAL THERAPY | Age: 79
Setting detail: THERAPIES SERIES
Discharge: HOME | End: 2024-04-10
Attending: ORTHOPAEDIC SURGERY
Payer: MEDICARE

## 2024-04-10 PROCEDURE — 97140 MANUAL THERAPY 1/> REGIONS: CPT

## 2024-04-10 PROCEDURE — 97110 THERAPEUTIC EXERCISES: CPT

## 2024-04-10 NOTE — FLOWSHEET NOTE
improve muscular strength or increase flexibility, following either an injury or surgery.   (07975) THERAPEUTIC ACTIVITY - use of dynamic activities to improve functional performance. (Ex include squatting, ascending/descending stairs, walking, bending, lifting, catching, throwing, pushing, pulling, jumping.)  Direct, one on one contact, billed in 15-minute increments.  (73737) MANUAL THERAPY -  Manual therapy techniques, 1 or more regions, each 15 minutes (Mobilization/manipulation, manual lymphatic drainage, manual traction) for the purpose of modulating pain, promoting relaxation,  increasing ROM, reducing/eliminating soft tissue swelling/inflammation/restriction, improving soft tissue extensibility and allowing for proper ROM for normal function with self care, mobility, lifting and ambulation      GOALS     Patient stated goal: to get better  Status:  [x] Progressing: [] Met: [] Not Met: [] Adjusted    Therapist goals for Patient:   Short Term Goals: To be achieved in: 2 weeks  Independent in HEP and progression per patient tolerance, in order to progress toward full function and prevent re-injury.    Status: [x] Progressing: [] Met: [] Not Met: [] Adjusted  Patient will have a decrease in pain to 5/10 to help facilitate improvement in movement, function, and ADLs as indicated by functional deficits.   Status: [x] Progressing: [] Met: [] Not Met: [] Adjusted    Long Term Goals: To be achieved in: 8 weeks  Disability index score of 20% or less for the WOMAC to assist with return top prior level of function.   Status: [x] Progressing: [] Met: [] Not Met: [] Adjusted  Improve knee AROM to 120-130 degrees or better to allow for proper joint functioning as indicated by patients functional deficits.  Status: [x] Progressing: [] Met: [] Not Met: [] Adjusted  Pt to improve strength to 4+/5 or better of quadriceps to allow for proper muscle and joint use in functional mobility, ADLs and prior level of function   Status:

## 2024-04-11 ENCOUNTER — APPOINTMENT (OUTPATIENT)
Dept: PHYSICAL THERAPY | Age: 79
End: 2024-04-11
Attending: ORTHOPAEDIC SURGERY
Payer: MEDICARE

## 2024-04-12 ENCOUNTER — HOSPITAL ENCOUNTER (OUTPATIENT)
Dept: PHYSICAL THERAPY | Age: 79
Setting detail: THERAPIES SERIES
Discharge: HOME | End: 2024-04-12
Attending: ORTHOPAEDIC SURGERY
Payer: MEDICARE

## 2024-04-12 PROCEDURE — 97530 THERAPEUTIC ACTIVITIES: CPT

## 2024-04-12 PROCEDURE — 97140 MANUAL THERAPY 1/> REGIONS: CPT

## 2024-04-12 PROCEDURE — 97110 THERAPEUTIC EXERCISES: CPT

## 2024-04-12 NOTE — FLOWSHEET NOTE
[facility]- Outpatient Rehabilitation and Therapy [address] [place] [address] office: [phone] fax: [phone]     Physical Therapy: TREATMENT/PROGRESS NOTE   Patient: Vicente Cervantes (79 y.o. male)   Examination Date: 2024   :  1945 MRN: [id number]   Visit #: 18  Manual Entry 3/28, KX  Insurance Allowable Auth Needed   BCBS Medicare []Yes    [x]No    Insurance: Payor: BCBS MEDICARE / Plan: ANTHEM MEDIBLUE ESSENTIAL/PLUS / Product Type: *No Product type* /   Insurance ID: [id number] - (Medicare Managed)  Secondary Insurance (if applicable):    Treatment Diagnosis:     ICD-10-CM    1. Pain  R52       2. Stiffness in joint  M25.60       3. Weakness  R53.1       4. 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  His flexion has improved since his last appointment 2 weeks ago.  He is only 4.5 weeks out from surgery.  I recommended that he continue to work with physical therapy and come in in 2 weeks for repeat assessment to see how much his flexion has improved.  We may consider manipulation at that time if he remains stiff.       Patient stated complaint:   Right TKA 23.     - Patient stated complaint:   Right TKA 23 - lots of pain  /10  3/01/24: Patient to skilled therapy stating he took two oxy \"about 20 minutes\" before coming in this AM.

## 2024-04-15 ENCOUNTER — HOSPITAL ENCOUNTER (OUTPATIENT)
Dept: PHYSICAL THERAPY | Age: 79
Setting detail: THERAPIES SERIES
Discharge: HOME | End: 2024-04-15
Attending: ORTHOPAEDIC SURGERY
Payer: MEDICARE

## 2024-04-15 ENCOUNTER — OFFICE VISIT (OUTPATIENT)
Dept: ORTHOPEDIC SURGERY | Age: 79
End: 2024-04-15

## 2024-04-15 DIAGNOSIS — Z96.651 STATUS POST TOTAL RIGHT KNEE REPLACEMENT: Primary | ICD-10-CM

## 2024-04-15 PROCEDURE — 97530 THERAPEUTIC ACTIVITIES: CPT

## 2024-04-15 PROCEDURE — 99024 POSTOP FOLLOW-UP VISIT: CPT | Performed by: ORTHOPAEDIC SURGERY

## 2024-04-15 PROCEDURE — 97140 MANUAL THERAPY 1/> REGIONS: CPT

## 2024-04-15 PROCEDURE — 97110 THERAPEUTIC EXERCISES: CPT

## 2024-04-15 NOTE — FLOWSHEET NOTE
post nustep on black chair    PROM 116 degrees 4/08/24      Supine PROM flexion  Patellar mobilizations  X 10 mins  R foot on pillow case on board, therapist provided manual overpressure          NMR re-education (57221) resistance Sets/time Reps CUES NEEDED              5                        Therapeutic Activity (57595)  Sets/time     Step ups fwd  8\"  8\"  2  2 10  10    Step downs 8\" 2 10       1  1   10x   10x   L LE on slider  L LE on slider       SLS  10 sec 5x R In // bars   [employer]  side to side, f/b  1 min      2 10x    Lunges fwd  1 10x Cues     Modalities:    No modalities applied this session     Education/Home Exercise Program: HEP discussed and performed, see exercise grid        ASSESSMENT     Today's Assessment:  Patient with continued progress towards rehab goals. Firm end feel with knee flexion. Continues work on reaching goal of 120 degrees flexion. Focused on eccentric quad control.  He would continue to benefit from skilled therapy to further enhance R quadriceps muscle performance to promote a safe and pain-free return to his active PLOF; which includes golf.     Medical Necessity Documentation:  I certify that this patient meets the below criteria necessary for medical necessity for care and/or justification of therapy services:  The patient has functional impairments and/or activity limitations and would benefit from continued outpatient therapy services to address the deficits outlined in the patients goals      Return to Play: NA    Prognosis for POC: [x] Good [] Fair  [] Poor    Patient requires continued skilled intervention: [x] Yes  [] No      CHARGE CAPTURE     PT CHARGE GRID   CPT Code (TIMED) minutes #    Therex (56964)  25 1    Neuromusc. Re-ed (37165)      Manual (53711) 10 1    Ther. Act (91525) 15 1    Gait (65962)      Aquatic Therex (20070)      Iontophoresis (15429)      Ultrasound (79738)      Estim Attended (34981)      Other:     Total Timed Code Tx Minutes 50 1     Total

## 2024-04-15 NOTE — PROGRESS NOTES
[facility]  Office Visit    Chief Complaint: Follow-up s/p right total knee arthroplasty    HPI:  Vicente Cervantes is a 79 y.o. who is here in follow-up of right total knee arthroplasty performed on February 6, 2024.  He walks without assistive device.  He continues to be active in physical therapy and home exercises.  He does report occasional left knee pain.    Exam:  There were no vitals taken for this visit.     Appearance: sitting in exam room chair, appears to be in no acute distress, awake and alert  Resp: unlabored breathing on room air  Skin: warm, dry and intact with out erythema or significant increased temperature  Neuro: grossly intact both lower extremities. Intact sensation to light touch. Motor exam 4+ to 5/5 in all major motor groups.  Right knee: Incision is healed.  Range of motion is 0 to 115 degrees.  Sensation is intact light touch.  There is brisk capillary refill. There is 5/5 muscle strength in all muscle groups.    Imaging:  None    Assessment:  S/p right total knee arthroplasty    Plan:  He continues his postoperative recovery.  He is recovering very well and his knee flexion continues to improve.  He will continue working with outpatient therapy and with self-directed therapy exercises.  Follow-up in 6 weeks for repeat assessment and radiographs of the right knee.  We also briefly discussed his left knee.  He may return as needed for steroid or viscosupplementation injections should it become more symptomatic.    This dictation was done with [employer] dictation and may contain mechanical errors related to translation.

## 2024-04-17 ENCOUNTER — HOSPITAL ENCOUNTER (OUTPATIENT)
Dept: PHYSICAL THERAPY | Age: 79
Setting detail: THERAPIES SERIES
Discharge: HOME | End: 2024-04-17
Attending: ORTHOPAEDIC SURGERY
Payer: MEDICARE

## 2024-04-17 PROCEDURE — 97110 THERAPEUTIC EXERCISES: CPT

## 2024-04-17 PROCEDURE — 97530 THERAPEUTIC ACTIVITIES: CPT

## 2024-04-17 NOTE — FLOWSHEET NOTE
improve functional performance. (Ex include squatting, ascending/descending stairs, walking, bending, lifting, catching, throwing, pushing, pulling, jumping.)  Direct, one on one contact, billed in 15-minute increments.  (56903) MANUAL THERAPY -  Manual therapy techniques, 1 or more regions, each 15 minutes (Mobilization/manipulation, manual lymphatic drainage, manual traction) for the purpose of modulating pain, promoting relaxation,  increasing ROM, reducing/eliminating soft tissue swelling/inflammation/restriction, improving soft tissue extensibility and allowing for proper ROM for normal function with self care, mobility, lifting and ambulation      GOALS     Patient stated goal: to get better  Status:  [x] Progressing: [] Met: [] Not Met: [] Adjusted    Therapist goals for Patient:   Short Term Goals: To be achieved in: 2 weeks  Independent in HEP and progression per patient tolerance, in order to progress toward full function and prevent re-injury.    Status: [x] Progressing: [] Met: [] Not Met: [] Adjusted  Patient will have a decrease in pain to 5/10 to help facilitate improvement in movement, function, and ADLs as indicated by functional deficits.   Status: [x] Progressing: [] Met: [] Not Met: [] Adjusted    Long Term Goals: To be achieved in: 8 weeks  Disability index score of 20% or less for the WOMAC to assist with return top prior level of function.   Status: [x] Progressing: [] Met: [] Not Met: [] Adjusted  Improve knee AROM to 120-130 degrees or better to allow for proper joint functioning as indicated by patients functional deficits.  Status: [x] Progressing: [] Met: [] Not Met: [] Adjusted  Pt to improve strength to 4+/5 or better of quadriceps to allow for proper muscle and joint use in functional mobility, ADLs and prior level of function   Status: [x] Progressing: [] Met: [] Not Met: [] Adjusted  Patient will return to  Usual work, housework or activities  without increased symptoms or

## 2024-04-19 ENCOUNTER — HOSPITAL ENCOUNTER (OUTPATIENT)
Dept: PHYSICAL THERAPY | Age: 79
Setting detail: THERAPIES SERIES
Discharge: HOME | End: 2024-04-19
Attending: ORTHOPAEDIC SURGERY
Payer: MEDICARE

## 2024-04-19 PROCEDURE — 97110 THERAPEUTIC EXERCISES: CPT

## 2024-04-19 PROCEDURE — 97530 THERAPEUTIC ACTIVITIES: CPT

## 2024-04-19 NOTE — FLOWSHEET NOTE
[facility]- Outpatient Rehabilitation and Therapy [address] [place] [address] office: [phone] fax: [phone]     Physical Therapy: TREATMENT/PROGRESS NOTE   Patient: Vicente Cervantes (79 y.o. male)   Examination Date: 2024   :  1945 MRN: [id number]   Visit #: 22  Manual Entry 3/28, KX  Insurance Allowable Auth Needed   BCBS Medicare []Yes    [x]No    Insurance: Payor: BCBS MEDICARE / Plan: ANTHEM MEDIBLUE ESSENTIAL/PLUS / Product Type: *No Product type* /   Insurance ID: [id number] - (Medicare Managed)  Secondary Insurance (if applicable):    Treatment Diagnosis:     ICD-10-CM    1. Pain  R52       2. Stiffness in joint  M25.60       3. Weakness  R53.1       4. 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  His flexion has improved since his last appointment 2 weeks ago.  He is only 4.5 weeks out from surgery.  I recommended that he continue to work with physical therapy and come in in 2 weeks for repeat assessment to see how much his flexion has improved.  We may consider manipulation at that time if he remains stiff.       Patient stated complaint:   Right TKA 23.     - Patient stated complaint:   Right TKA 23 - lots of pain  /10  3/01/24: Patient to skilled therapy stating he took two oxy \"about 20 minutes\" before coming in this AM.  - Hb threshold 9  - iron supplement TID   - s/p pRBC's 15ml/kg on 1/3  - Hb stable as of 1/16

## 2024-04-22 ENCOUNTER — HOSPITAL ENCOUNTER (OUTPATIENT)
Dept: PHYSICAL THERAPY | Age: 79
Setting detail: THERAPIES SERIES
Discharge: HOME | End: 2024-04-22
Attending: ORTHOPAEDIC SURGERY
Payer: MEDICARE

## 2024-04-22 PROCEDURE — 97110 THERAPEUTIC EXERCISES: CPT

## 2024-04-22 PROCEDURE — 97530 THERAPEUTIC ACTIVITIES: CPT

## 2024-04-22 NOTE — FLOWSHEET NOTE
3/5  - 3-4/10  feels that he has been able to stretch more - less overall pain and swelling since beginning meloxicam.  3/7 - had a rough night - only slept a few hours,  Has iced twice  3/12 -  to MD last week - recommends continued therapy to improve flexion - A bit better in terms of pain last few days.  3/14 -  doing the exercises notes no new pain with activities.  Pain 3-4/10  3/15 - Patient reports experiencing 3/10 pain surrounding R knee cap. States \"After yesterday's session, I noticed I was able to go up steps one foot over the other instead of one foot at a time\".  3/19 -  Pt reports that he is moving more easily and having less pain.  Pleased that his motion is getting better.  3/21: Pt to PT with std cane, pt describes intermittent pain that ranges from 0/10 to 3-4/10 with activity and movement and occasional sharp shooting pains through the patella; functional assessment today for WOMAC improved to 34  3/22/24: Patient to skilled therapy stating he would like to increase frequency of therapy from 2x/week to 3x/week to continue to improve L knee bending.  3/26: a little more sore and much stiffer today b/c he took a 1 mile walk at [facility] yesterday; rating pain at 2-3/10; to PT without AD   3/28 -  doing well today,  not so stiff,  worked a little on golf today,  warming up.  4/1: Pt notes having some discomfort medial knee area with some swelling.  Went and practiced golf for about 3.5 hours prior to PT today.  Pain is more annoying than anything.   4/03: Patient to skilled therapy stating \"I had a lot of pain last night, it's just in the knee cap though\".   04/05/24: Patient to skilled therapy with ache in R knee cap. Patient reports ache is currently 2/10.  4/8/24: Pt states his knee is still a little swollen and sore.  Little bit of soreness in L knee from hitting some golf balls.   4/10/24: Pt states he is doing good, only place he gets some pain is in his knee cap, everything else feels

## 2024-04-24 ENCOUNTER — HOSPITAL ENCOUNTER (OUTPATIENT)
Dept: PHYSICAL THERAPY | Age: 79
Setting detail: THERAPIES SERIES
Discharge: HOME | End: 2024-04-24
Attending: ORTHOPAEDIC SURGERY
Payer: MEDICARE

## 2024-04-24 PROCEDURE — 97110 THERAPEUTIC EXERCISES: CPT

## 2024-04-24 PROCEDURE — 97530 THERAPEUTIC ACTIVITIES: CPT

## 2024-04-24 NOTE — FLOWSHEET NOTE
pain to 5/10 to help facilitate improvement in movement, function, and ADLs as indicated by functional deficits.   Status: [x] Progressing: [] Met: [] Not Met: [] Adjusted    Long Term Goals: To be achieved in: 8 weeks  Disability index score of 20% or less for the WOMAC to assist with return top prior level of function.   Status: [x] Progressing: [] Met: [] Not Met: [] Adjusted  Improve knee AROM to 120-130 degrees or better to allow for proper joint functioning as indicated by patients functional deficits.  Status: [x] Progressing: [] Met: [] Not Met: [] Adjusted  Pt to improve strength to 4+/5 or better of quadriceps to allow for proper muscle and joint use in functional mobility, ADLs and prior level of function   Status: [x] Progressing: [] Met: [] Not Met: [] Adjusted  Patient will return to  Usual work, housework or activities  without increased symptoms or restriction to work towards return to prior level of function.        Status: [x] Progressing: [] Met: [] Not Met: [] Adjusted  Patient will perform normal ADLs without symptoms 80% of the time             Status: [x] Progressing: [] Met: [] Not Met: [] Adjusted    TREATMENT PLAN       Plan: Cont POC- Continue emphasis/focus on exercise progression, increasing ROM, and static and dynamic balance. Next visit plan to progress weights and progress towards SL strengthening and focus on eccentric quad work   DO PN NEXT VISIT    Electronically Signed by Christy Sylvester, PT    Date: 04/24/2024    Note: Portions of this note have been templated and/or copied from initial evaluation, reassessments and prior notes for documentation efficiency.

## 2024-04-26 ENCOUNTER — HOSPITAL ENCOUNTER (OUTPATIENT)
Dept: PHYSICAL THERAPY | Age: 79
Setting detail: THERAPIES SERIES
Discharge: HOME | End: 2024-04-26
Attending: ORTHOPAEDIC SURGERY
Payer: MEDICARE

## 2024-04-26 PROCEDURE — 97110 THERAPEUTIC EXERCISES: CPT

## 2024-04-26 PROCEDURE — 97530 THERAPEUTIC ACTIVITIES: CPT

## 2024-04-26 NOTE — FLOWSHEET NOTE
[] Met: [] Not Met: [] Adjusted    Long Term Goals: To be achieved in: 8 weeks  Disability index score of 20% or less for the WOMAC to assist with return top prior level of function.   Status: [x] Progressing: [] Met: [] Not Met: [] Adjusted  Improve knee AROM to 120-130 degrees or better to allow for proper joint functioning as indicated by patients functional deficits.  Status: [x] Progressing: [] Met: [] Not Met: [] Adjusted  Pt to improve strength to 4+/5 or better of quadriceps to allow for proper muscle and joint use in functional mobility, ADLs and prior level of function   Status: [x] Progressing: [] Met: [] Not Met: [] Adjusted  Patient will return to  Usual work, housework or activities  without increased symptoms or restriction to work towards return to prior level of function.        Status: [x] Progressing: [] Met: [] Not Met: [] Adjusted  Patient will perform normal ADLs without symptoms 80% of the time             Status: [x] Progressing: [] Met: [] Not Met: [] Adjusted    TREATMENT PLAN       Plan: Cont POC- Continue emphasis/focus on exercise progression, increasing ROM, and static and dynamic balance. Next visit plan to progress weights and progress towards SL strengthening and focus on eccentric quad work   DO PN NEXT VISIT    Electronically Signed by Christy Sylvester, PT    Date: 04/26/2024    Note: Portions of this note have been templated and/or copied from initial evaluation, reassessments and prior notes for documentation efficiency.

## 2024-04-29 ENCOUNTER — HOSPITAL ENCOUNTER (OUTPATIENT)
Dept: PHYSICAL THERAPY | Age: 79
Setting detail: THERAPIES SERIES
Discharge: HOME | End: 2024-04-29
Attending: ORTHOPAEDIC SURGERY
Payer: MEDICARE

## 2024-04-29 PROCEDURE — 97530 THERAPEUTIC ACTIVITIES: CPT

## 2024-04-29 PROCEDURE — 97110 THERAPEUTIC EXERCISES: CPT

## 2024-04-29 NOTE — PLAN OF CARE
[facility]- Outpatient Rehabilitation and Therapy [address] [place] [address] office: [phone] fax: [phone]         Physical Therapy Re-Certification Plan of Care/MD UPDATE      Dear Daniel Pate MD,    We had the pleasure of treating the following patient for physical therapy services at [facility].  A summary of our findings can be found in the updated assessment below.  This includes our plan of care.  If you have any questions or concerns regarding these findings, please do not hesitate to contact me at the office phone number checked above.  Thank you for the referral.     Physician Signature:________________________________Date:__________________  By signing above (or electronic signature), therapist’s plan is approved by physician    Date Range Of Visits: 2/26/24 to 4/29/24  Total Visits to Date: 26  Overall Response to Treatment:   [x]Patient is responding well to treatment and improvement is noted with regards  to goals   []Patient should continue to improve in reasonable time if they continue HEP   []Patient has plateaued and is no longer responding to skilled PT intervention    []Patient is getting worse and would benefit from return to referring MD   []Patient unable to adhere to initial POC   []Other:     Assessment: Patient's continues to have good progress for rehab timeframe.   Knee PROM seems to have plateaued at 0-118* with very tight end feel.  Eccentric quad weakness still limiting stair negotiating to step to pattern at times.  Visible fatigue noted by end of reps with eccentric lowering phase of exercises. He will continue to benefit from skilled therapy to further enhance R quadriceps muscle performance to promote a safe and pain-free return to his active PLOF; which includes golf and extensive yardwork.     Recommendation:    [x]Continue PT 3x / wk for 3 weeks.    []Hold PT, pending MD visit    Physical Therapy:

## 2024-05-01 ENCOUNTER — HOSPITAL ENCOUNTER (OUTPATIENT)
Dept: PHYSICAL THERAPY | Age: 79
Setting detail: THERAPIES SERIES
Discharge: HOME | End: 2024-05-01
Attending: ORTHOPAEDIC SURGERY
Payer: MEDICARE

## 2024-05-01 PROCEDURE — 97110 THERAPEUTIC EXERCISES: CPT

## 2024-05-01 PROCEDURE — 97530 THERAPEUTIC ACTIVITIES: CPT

## 2024-05-01 NOTE — FLOWSHEET NOTE
[facility]- Outpatient Rehabilitation and Therapy [address] [place] [address] office: [phone] fax: [phone]         Physical Therapy: TREATMENT/PROGRESS NOTE   Patient: Vicente Cervantes (79 y.o. male)   Examination Date: 2024   :  1945 MRN: [id number]   Visit #: 27  Manual Entry 3/28, KX  Insurance Allowable Auth Needed   BCBS Medicare []Yes    [x]No    Insurance: Payor: BCBS MEDICARE / Plan: ANTHEM MEDIBLUE ESSENTIAL/PLUS / Product Type: *No Product type* /   Insurance ID: [id number] - (Medicare Managed)  Secondary Insurance (if applicable):    Treatment Diagnosis:     ICD-10-CM    1. Pain  R52       2. Stiffness in joint  M25.60       3. Weakness  R53.1       4. 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  His flexion has improved since his last appointment 2 weeks ago.  He is only 4.5 weeks out from surgery.  I recommended that he continue to work with physical therapy and come in in 2 weeks for repeat assessment to see how much his flexion has improved.  We may consider manipulation at that time if he remains stiff.       Patient stated complaint:   Right TKA 23.     - Patient stated complaint:   Right TKA 23 - lots of pain  9/10  3/01/24: Patient to skilled therapy stating he took two oxy \"about

## 2024-05-03 ENCOUNTER — HOSPITAL ENCOUNTER (OUTPATIENT)
Dept: PHYSICAL THERAPY | Age: 79
Setting detail: THERAPIES SERIES
Discharge: HOME | End: 2024-05-03
Attending: ORTHOPAEDIC SURGERY
Payer: MEDICARE

## 2024-05-03 PROCEDURE — 97140 MANUAL THERAPY 1/> REGIONS: CPT

## 2024-05-03 PROCEDURE — 97530 THERAPEUTIC ACTIVITIES: CPT

## 2024-05-03 PROCEDURE — 97110 THERAPEUTIC EXERCISES: CPT

## 2024-05-03 NOTE — FLOWSHEET NOTE
[facility]- Outpatient Rehabilitation and Therapy [address] [place] [address] office: [phone] fax: [phone]         Physical Therapy: TREATMENT/PROGRESS NOTE   Patient: Vicente Cervantes (79 y.o. male)   Examination Date: 2024   :  1945 MRN: [id number]   Visit #: 28  Manual Entry 3/28, KX  Insurance Allowable Auth Needed   BCBS Medicare []Yes    [x]No    Insurance: Payor: BCBS MEDICARE / Plan: ANTHEM MEDIBLUE ESSENTIAL/PLUS / Product Type: *No Product type* /   Insurance ID: [id number] - (Medicare Managed)  Secondary Insurance (if applicable):    Treatment Diagnosis:     ICD-10-CM    1. Pain  R52       2. Stiffness in joint  M25.60       3. Weakness  R53.1       4. Decreased functional mobility  R26.89          Medical Diagnosis:  Status post total right knee replacement [Z96.651]   Referring Physician: Daniel Pate MD  PCP: Narcisa Alberts DO       Plan of care signed (Y/N): YES    Date of Patient follow up with Physician: 4/15/24     Progress Report/POC: NO  POC update due: (10 visits /OR AUTH LIMITS, whichever is less)  24                                      SUBJECTIVE EXAMINATION   Relevant Medical History: HTN     Test used Initial score  2/26/24 2024 5/3/2024   Pain Summary VAS 9-10 10 2/10   Functional questionnaire WOMAC 41 34 (3/21/24) 38/96 = 40% disability   Other:              MD note 3/8/24  He continues his postoperative recovery.  His flexion has improved since his last appointment 2 weeks ago.  He is only 4.5 weeks out from surgery.  I recommended that he continue to work with physical therapy and come in in 2 weeks for repeat assessment to see how much his flexion has improved.  We may consider manipulation at that time if he remains stiff.       Patient stated complaint:   Right TKA 23.     - Patient stated complaint:   Right TKA 23 - lots of pain  9/10  3/01/24: Patient to skilled therapy stating he took two oxy \"about

## 2024-05-06 ENCOUNTER — HOSPITAL ENCOUNTER (OUTPATIENT)
Dept: PHYSICAL THERAPY | Age: 79
Setting detail: THERAPIES SERIES
Discharge: HOME | End: 2024-05-06
Attending: ORTHOPAEDIC SURGERY
Payer: MEDICARE

## 2024-05-06 ENCOUNTER — OFFICE VISIT (OUTPATIENT)
Dept: ORTHOPEDIC SURGERY | Age: 79
End: 2024-05-06
Payer: MEDICARE

## 2024-05-06 DIAGNOSIS — M17.12 PRIMARY OSTEOARTHRITIS OF LEFT KNEE: Primary | ICD-10-CM

## 2024-05-06 DIAGNOSIS — Z96.651 STATUS POST TOTAL RIGHT KNEE REPLACEMENT: ICD-10-CM

## 2024-05-06 PROCEDURE — 97530 THERAPEUTIC ACTIVITIES: CPT

## 2024-05-06 PROCEDURE — 99213 OFFICE O/P EST LOW 20 MIN: CPT | Performed by: ORTHOPAEDIC SURGERY

## 2024-05-06 PROCEDURE — 1123F ACP DISCUSS/DSCN MKR DOCD: CPT | Performed by: ORTHOPAEDIC SURGERY

## 2024-05-06 PROCEDURE — 97110 THERAPEUTIC EXERCISES: CPT

## 2024-05-06 PROCEDURE — 20610 DRAIN/INJ JOINT/BURSA W/O US: CPT | Performed by: ORTHOPAEDIC SURGERY

## 2024-05-06 RX ORDER — TRIAMCINOLONE ACETONIDE 40 MG/ML
40 INJECTION, SUSPENSION INTRA-ARTICULAR; INTRAMUSCULAR ONCE
Status: COMPLETED | OUTPATIENT
Start: 2024-05-06 | End: 2024-05-06

## 2024-05-06 RX ORDER — BUPIVACAINE HYDROCHLORIDE 2.5 MG/ML
2 INJECTION, SOLUTION INFILTRATION; PERINEURAL ONCE
Status: COMPLETED | OUTPATIENT
Start: 2024-05-06 | End: 2024-05-06

## 2024-05-06 RX ADMIN — TRIAMCINOLONE ACETONIDE 40 MG: 40 INJECTION, SUSPENSION INTRA-ARTICULAR; INTRAMUSCULAR at 16:07

## 2024-05-06 RX ADMIN — BUPIVACAINE HYDROCHLORIDE 5 MG: 2.5 INJECTION, SOLUTION INFILTRATION; PERINEURAL at 16:06

## 2024-05-06 NOTE — FLOWSHEET NOTE
muscle and joint use in functional mobility, ADLs and prior level of function   Status: [] Progressing: [x] Met: [] Not Met: [] Adjusted  Patient will return to  Usual work, housework or activities  without increased symptoms or restriction to work towards return to prior level of function.        Status: [x] Progressing: [] Met: [] Not Met: [] Adjusted  Patient will perform normal ADLs without symptoms 80% of the time             Status: [] Progressing: [x] Met: [] Not Met: [] Adjusted    TREATMENT PLAN     Plan: Cont POC- Continue emphasis/focus on exercise progression, increasing ROM, and static and dynamic balance. Next visit plan to work towards d/c with HEP at end of the week.        Electronically Signed by Christy Sylvester, PT    Date: 05/06/2024    Note: Portions of this note have been templated and/or copied from initial evaluation, reassessments and prior notes for documentation efficiency.

## 2024-05-08 ENCOUNTER — HOSPITAL ENCOUNTER (OUTPATIENT)
Dept: PHYSICAL THERAPY | Age: 79
Setting detail: THERAPIES SERIES
Discharge: HOME | End: 2024-05-08
Attending: ORTHOPAEDIC SURGERY
Payer: MEDICARE

## 2024-05-08 PROCEDURE — 97530 THERAPEUTIC ACTIVITIES: CPT

## 2024-05-08 PROCEDURE — 97110 THERAPEUTIC EXERCISES: CPT

## 2024-05-08 NOTE — PROGRESS NOTES
[facility] Orthopaedics and Spine  Office Visit    Chief Complaint: Follow-up s/p right total knee arthroplasty; left knee pain    HPI:  Vicente Cervantes is a 79 y.o. who is here in follow-up of right total knee arthroplasty performed on February 6, 2024.  He continues self-directed therapy exercises.  He walks with a limp and walks without assistive device.  He reports continued swelling in the right knee.  He also is having left knee pain on a daily basis.  He was last seen for this issue in January 2024 and's been treated in the past with steroid injections.  He has known osteoarthritis in the left knee.  He currently rates his left knee pain as 9/10.    Exam:  Appearance: sitting in exam room chair, appears to be in no acute distress, awake and alert  Resp: unlabored breathing on room air  Skin: warm, dry and intact with out erythema or significant increased temperature  Neuro: grossly intact both lower extremities. Intact sensation to light touch. Motor exam 4+ to 5/5 in all major motor groups.  Right knee: Incision is healed.  Range of motion is 3 to 115 degrees.  Sensation is intact light touch.  There is brisk capillary refill. There is 5/5 muscle strength in all muscle groups.  Left knee: Examination reveals that active knee range of motion is 0 to 125 degrees.  There is varus deformity, positive crepitus, positive joint line tenderness, positive antalgic gait.  Neurologically, plantar flexion and dorsiflexion is intact. 5/5 strength.     Imaging:  Prior left knee radiographs reviewed are significant for tricompartmental degenerative changes with bone-on-bone arthritis of the medial compartment.    Assessment:  Left knee osteoarthritis  S/p right total knee arthroplasty    Plan:  We discussed the diagnosis and treatment options.  We discussed treatment options for osteoarthritis to include activity modification, physical therapy exercises, nonsteroidal anti-inflammatory medication (NSAIDs),

## 2024-05-08 NOTE — FLOWSHEET NOTE
[facility]- Outpatient Rehabilitation and Therapy [address] [place] [address] office: [phone] fax: [phone]         Physical Therapy: TREATMENT/PROGRESS NOTE   Patient: Vicente Cervantes (79 y.o. male)   Examination Date: 2024   :  1945 MRN: [id number]   Visit #: 30  Manual Entry 3/28, KX  Insurance Allowable Auth Needed   BCBS Medicare []Yes    [x]No    Insurance: Payor: BCBS MEDICARE / Plan: ANTHEM MEDIBLUE ESSENTIAL/PLUS / Product Type: *No Product type* /   Insurance ID: [id number] - (Medicare Managed)  Secondary Insurance (if applicable):    Treatment Diagnosis:     ICD-10-CM    1. Pain  R52       2. Stiffness in joint  M25.60       3. Weakness  R53.1       4. Decreased functional mobility  R26.89          Medical Diagnosis:  Status post total right knee replacement [Z96.651]   Referring Physician: Daniel Pate MD  PCP: Narcisa Alberts DO       Plan of care signed (Y/N): YES    Date of Patient follow up with Physician: 4/15/24     Progress Report/POC: NO  POC update due: (10 visits /OR AUTH LIMITS, whichever is less)  24                                      SUBJECTIVE EXAMINATION   Relevant Medical History: HTN     Test used Initial score  2024   Pain Summary VAS 9-10 210 510   Functional questionnaire WOMAC 41 34 (3/21/24) 38/96 = 40% disability   Other:              MD note 3/8/24  He continues his postoperative recovery.  His flexion has improved since his last appointment 2 weeks ago.  He is only 4.5 weeks out from surgery.  I recommended that he continue to work with physical therapy and come in in 2 weeks for repeat assessment to see how much his flexion has improved.  We may consider manipulation at that time if he remains stiff.       Patient stated complaint:   Right TKA 23.     - Patient stated complaint:   Right TKA 23 - lots of pain  9/10  3/01/24: Patient to skilled therapy stating he took two oxy \"about

## 2024-05-10 ENCOUNTER — HOSPITAL ENCOUNTER (OUTPATIENT)
Dept: PHYSICAL THERAPY | Age: 79
Setting detail: THERAPIES SERIES
Discharge: HOME | End: 2024-05-10
Attending: ORTHOPAEDIC SURGERY
Payer: MEDICARE

## 2024-05-10 PROCEDURE — 97110 THERAPEUTIC EXERCISES: CPT

## 2024-05-10 PROCEDURE — 97530 THERAPEUTIC ACTIVITIES: CPT

## 2024-05-10 NOTE — PLAN OF CARE
discussed and performed, see exercise grid    5/8/24: Updated HEP include the following:  Access Code: [email or web address]  Exercises  - Standing Hamstring Stretch on Chair  - 1 x daily - 7 x weekly - 3 reps - 20 secs hold  - Standing Knee Flexion Stretch on Step  - 1 x daily - 7 x weekly - 3 reps - 20 secs hold  - Standing Gastroc Stretch  - 1 x daily - 7 x weekly - 3 reps - 20 secs hold  - Single Leg Heel Raise with Chair Support  - 1 x daily - 7 x weekly - 1 sets - 20 reps  - Forward Step Up  - 1 x daily - 7 x weekly - 20 reps  - Lateral Step Up  - 1 x daily - 7 x weekly - 20 reps  - Forward Step Down with Counter Support at Side  - 1 x daily - 7 x weekly - 20 reps  - Single Leg Stance  - 1 x daily - 7 x weekly - 1 reps - 1 min hold  - Wall Squat with Leg Extensions  - 1 x daily - 7 x weekly - 1 sets - 10 reps  - Long sitting HS stretch  - 1 x daily - 7 x weekly - 3 reps - 20 secs hold  - Supine Heel Slide with Strap  - 1 x daily - 7 x weekly - 1 sets - 10 reps - 10 secs hold  Patient verbalized/demonstrated understanding and was issued written handout.     5/10/24: Pt issued 30 day free pass to fitness center as well appropriate         ASSESSMENT     Today's Assessment: TRANSITION TO HEP/DC FORMAL PT: Patient is currently independent with symptom management and home exercise program. Patient reports understanding of the importance of continued compliance with home exercise program after discharge.  Patient has reached 2/5 long term goals and should reach all additional goals with compliance to home exercise program upon discharge.      Medical Necessity Documentation:  I certify that this patient meets the below criteria necessary for medical necessity for care and/or justification of therapy services:  The patient has functional impairments and/or activity limitations and would benefit from continued outpatient therapy services to address the deficits outlined in the patients goals      Return to Play: NA    Prognosis

## 2024-05-23 ENCOUNTER — TELEPHONE (OUTPATIENT)
Dept: ORTHOPEDIC SURGERY | Age: 79
End: 2024-05-23

## 2024-05-23 RX ORDER — AMOXICILLIN 500 MG/1
CAPSULE ORAL
Qty: 4 CAPSULE | Refills: 1 | Status: SHIPPED | OUTPATIENT
Start: 2024-05-23

## 2024-05-23 NOTE — TELEPHONE ENCOUNTER
Prescription Refill     Medication Name:  ANTIBIOTIC  [facility]: [facility]  Patient Contact Number:  [phone]       PATIENT HAS A DENTAL APPT UPCOMING

## 2024-08-08 ENCOUNTER — OFFICE VISIT (OUTPATIENT)
Dept: ORTHOPEDIC SURGERY | Age: 79
End: 2024-08-08

## 2024-08-08 VITALS — BODY MASS INDEX: 25.62 KG/M2 | HEIGHT: 70 IN | WEIGHT: 179 LBS

## 2024-08-08 DIAGNOSIS — M17.12 ARTHRITIS OF LEFT KNEE: ICD-10-CM

## 2024-08-08 DIAGNOSIS — Z96.651 STATUS POST TOTAL RIGHT KNEE REPLACEMENT: Primary | ICD-10-CM

## 2024-08-08 RX ORDER — BUPIVACAINE HYDROCHLORIDE 2.5 MG/ML
2 INJECTION, SOLUTION INFILTRATION; PERINEURAL ONCE
Status: COMPLETED | OUTPATIENT
Start: 2024-08-08 | End: 2024-08-08

## 2024-08-08 RX ORDER — TRIAMCINOLONE ACETONIDE 40 MG/ML
40 INJECTION, SUSPENSION INTRA-ARTICULAR; INTRAMUSCULAR ONCE
Status: COMPLETED | OUTPATIENT
Start: 2024-08-08 | End: 2024-08-08

## 2024-08-08 RX ADMIN — BUPIVACAINE HYDROCHLORIDE 5 MG: 2.5 INJECTION, SOLUTION INFILTRATION; PERINEURAL at 15:15

## 2024-08-08 RX ADMIN — TRIAMCINOLONE ACETONIDE 40 MG: 40 INJECTION, SUSPENSION INTRA-ARTICULAR; INTRAMUSCULAR at 15:15

## 2024-08-09 NOTE — PROGRESS NOTES
[facility] Orthopaedics and Spine  Office Visit    Chief Complaint: Follow-up s/p right total knee arthroplasty; left knee pain    HPI:  Vicente Cervantes is a 79 y.o. who is here in follow-up of right total knee arthroplasty performed on February 6, 2024.  He continues self-directed therapy exercises.  He reports an intermittent sharp pain in the knee.  Overall, the right knee is doing well.  He takes Tylenol as needed for pain.  He goes to the gym 3 times a week.  He continues to report left knee pain.  He was last seen for this in May and underwent a steroid injection at that time.  This helped for 1 month.  He walks without assistive device.      Exam:  Appearance: sitting in exam room chair, appears to be in no acute distress, awake and alert  Resp: unlabored breathing on room air  Skin: warm, dry and intact with out erythema or significant increased temperature  Neuro: grossly intact both lower extremities. Intact sensation to light touch. Motor exam 4+ to 5/5 in all major motor groups.  Right knee: Incision is healed.  Range of motion is 3 to 110 degrees.  Sensation is intact light touch.  There is brisk capillary refill. There is 5/5 muscle strength in all muscle groups.  Left knee: Examination reveals that active knee range of motion is 0 to 120 degrees.  There is varus deformity, positive crepitus, positive joint line tenderness, positive antalgic gait.  Neurologically, plantar flexion and dorsiflexion is intact. 5/5 strength.     Imaging:  Prior left knee radiographs reviewed are significant for tricompartmental degenerative changes with bone-on-bone arthritis of the medial compartment.    3 views of the right knee were performed and reviewed today. Significant for total knee arthroplasty prosthesis in place with no signs of osteolysis, loosening, fracture, or dislocation.     Assessment:  Left knee osteoarthritis  S/p right total knee arthroplasty    Plan:  We discussed the diagnosis and treatment

## 2025-08-04 ENCOUNTER — OFFICE VISIT (OUTPATIENT)
Dept: ORTHOPEDIC SURGERY | Age: 80
End: 2025-08-04

## 2025-08-04 VITALS — WEIGHT: 179 LBS | BODY MASS INDEX: 25.62 KG/M2 | HEIGHT: 70 IN

## 2025-08-04 DIAGNOSIS — M70.51 PES ANSERINUS BURSITIS OF RIGHT KNEE: ICD-10-CM

## 2025-08-04 DIAGNOSIS — M17.12 ARTHRITIS OF LEFT KNEE: ICD-10-CM

## 2025-08-04 DIAGNOSIS — Z96.651 STATUS POST TOTAL RIGHT KNEE REPLACEMENT: Primary | ICD-10-CM

## 2025-08-04 RX ORDER — BUPIVACAINE HYDROCHLORIDE 2.5 MG/ML
2 INJECTION, SOLUTION INFILTRATION; PERINEURAL ONCE
Status: COMPLETED | OUTPATIENT
Start: 2025-08-04 | End: 2025-08-04

## 2025-08-04 RX ORDER — TRIAMCINOLONE ACETONIDE 40 MG/ML
40 INJECTION, SUSPENSION INTRA-ARTICULAR; INTRAMUSCULAR ONCE
Status: COMPLETED | OUTPATIENT
Start: 2025-08-04 | End: 2025-08-04

## 2025-08-04 RX ADMIN — TRIAMCINOLONE ACETONIDE 40 MG: 40 INJECTION, SUSPENSION INTRA-ARTICULAR; INTRAMUSCULAR at 14:31

## 2025-08-04 RX ADMIN — BUPIVACAINE HYDROCHLORIDE 5 MG: 2.5 INJECTION, SOLUTION INFILTRATION; PERINEURAL at 14:31

## 2025-08-04 RX ADMIN — BUPIVACAINE HYDROCHLORIDE 5 MG: 2.5 INJECTION, SOLUTION INFILTRATION; PERINEURAL at 14:30

## (undated) DEVICE — SOLUTION IV 1000ML 0.9% SOD CHL FOR IRRIG PLAS CONT

## (undated) DEVICE — PIN BNE FIX L110MM DIA32MM

## (undated) DEVICE — TOWEL,OR,DSP,ST,BLUE,STD,4/PK,20PK/CS: Brand: MEDLINE

## (undated) DEVICE — 1010 S-DRAPE TOWEL DRAPE 10/BX: Brand: STERI-DRAPE™

## (undated) DEVICE — CORD RETRCT SIL

## (undated) DEVICE — HYPODERMIC SAFETY NEEDLE: Brand: MONOJECT

## (undated) DEVICE — SUTURE STRATAFIX SPRL SZ 2 0 L14IN ABSRB UD MH L36MM 1 2 CIR SXMD2B401

## (undated) DEVICE — BANDAGE COMPR W6INXL15YD WHT BGE POLY COT WV E HK LOOP CLSR

## (undated) DEVICE — SOLUTION PREP PAINT POV IOD FOR SKIN MUCOUS MEM

## (undated) DEVICE — SUTURE ABSORBABLE MONOFILAMENT 1 OS-8 36 CM 40 MM VIO PDS +

## (undated) DEVICE — ADHESIVE SKIN CLOSURE XL 42 CM 2.7 CC MESH LIQUIBAND SECUR

## (undated) DEVICE — PADDING CAST W6INXL4YD COT LO LINTING WYTEX

## (undated) DEVICE — GOWN SIRUS NONREIN XL W/TWL: Brand: MEDLINE INDUSTRIES, INC.

## (undated) DEVICE — KIT DRP FOR RIO ROBOTIC ARM ASST SYS

## (undated) DEVICE — DUAL CUT SAGITTAL BLADE

## (undated) DEVICE — PIN BNE FIX TEMP L140MM DIA4MM MAKO

## (undated) DEVICE — MERCY HEALTH WEST TURNOVER: Brand: MEDLINE INDUSTRIES, INC.

## (undated) DEVICE — BASIC SINGLE BASIN 1-LF: Brand: MEDLINE INDUSTRIES, INC.

## (undated) DEVICE — TOTAL KNEE: Brand: MEDLINE INDUSTRIES, INC.

## (undated) DEVICE — SUTURE STRATAFIX SPRL SZ 3-0 L12IN ABSRB UD FS-1 L30X30CM SXMP2B410

## (undated) DEVICE — SUTURE STRATAFIX SPRL SZ 2 0 L14IN ABSRB UD MH L36MM 1 2 CIR SXMP2B401

## (undated) DEVICE — KIT TRK KNEE PROC VIZADISC

## (undated) DEVICE — DRAPE,ORTHOMAX,EXTREMITY: Brand: MEDLINE

## (undated) DEVICE — GLOVE ORTHO 8   MSG9480

## (undated) DEVICE — KIT INT FIX FEM TIB CKPT MAKOPLASTY

## (undated) DEVICE — SUTURE VCRL + SZ 1 L18IN ABSRB UD L36MM CT-1 1/2 CIR VCP841D

## (undated) DEVICE — GLOVE SURG SZ 8 L12IN FNGR THK79MIL GRN LTX FREE

## (undated) DEVICE — BOOT POS LEG DEMAYO

## (undated) DEVICE — PAD,NON-ADHERENT,3X8,STERILE,LF,1/PK: Brand: MEDLINE